# Patient Record
Sex: FEMALE | Race: OTHER | HISPANIC OR LATINO | Employment: FULL TIME | URBAN - METROPOLITAN AREA
[De-identification: names, ages, dates, MRNs, and addresses within clinical notes are randomized per-mention and may not be internally consistent; named-entity substitution may affect disease eponyms.]

---

## 2017-04-21 ENCOUNTER — HOSPITAL ENCOUNTER (OUTPATIENT)
Facility: MEDICAL CENTER | Age: 42
End: 2017-04-21
Attending: PHYSICIAN ASSISTANT
Payer: COMMERCIAL

## 2017-04-21 ENCOUNTER — OFFICE VISIT (OUTPATIENT)
Dept: URGENT CARE | Facility: CLINIC | Age: 42
End: 2017-04-21
Payer: COMMERCIAL

## 2017-04-21 VITALS
RESPIRATION RATE: 16 BRPM | HEIGHT: 63 IN | DIASTOLIC BLOOD PRESSURE: 76 MMHG | TEMPERATURE: 98.9 F | SYSTOLIC BLOOD PRESSURE: 132 MMHG | OXYGEN SATURATION: 100 % | BODY MASS INDEX: 29.06 KG/M2 | WEIGHT: 164 LBS | HEART RATE: 101 BPM

## 2017-04-21 DIAGNOSIS — A49.9 BACTERIAL UTI: Primary | ICD-10-CM

## 2017-04-21 DIAGNOSIS — N39.0 BACTERIAL UTI: Primary | ICD-10-CM

## 2017-04-21 DIAGNOSIS — A49.9 BACTERIAL UTI: ICD-10-CM

## 2017-04-21 DIAGNOSIS — N39.0 BACTERIAL UTI: ICD-10-CM

## 2017-04-21 DIAGNOSIS — R30.0 DYSURIA: ICD-10-CM

## 2017-04-21 LAB
APPEARANCE UR: NORMAL
BILIRUB UR STRIP-MCNC: NORMAL MG/DL
COLOR UR AUTO: YELLOW
GLUCOSE UR STRIP.AUTO-MCNC: NORMAL MG/DL
KETONES UR STRIP.AUTO-MCNC: 160 MG/DL
LEUKOCYTE ESTERASE UR QL STRIP.AUTO: NORMAL
NITRITE UR QL STRIP.AUTO: NORMAL
PH UR STRIP.AUTO: 5 [PH] (ref 5–8)
PROT UR QL STRIP: 30 MG/DL
RBC UR QL AUTO: NORMAL
SP GR UR STRIP.AUTO: 1.03
UROBILINOGEN UR STRIP-MCNC: NORMAL MG/DL

## 2017-04-21 PROCEDURE — 87086 URINE CULTURE/COLONY COUNT: CPT

## 2017-04-21 PROCEDURE — 99204 OFFICE O/P NEW MOD 45 MIN: CPT | Performed by: PHYSICIAN ASSISTANT

## 2017-04-21 PROCEDURE — 81002 URINALYSIS NONAUTO W/O SCOPE: CPT | Performed by: PHYSICIAN ASSISTANT

## 2017-04-21 PROCEDURE — 87077 CULTURE AEROBIC IDENTIFY: CPT

## 2017-04-21 PROCEDURE — 87186 SC STD MICRODIL/AGAR DIL: CPT

## 2017-04-21 RX ORDER — NITROFURANTOIN 25; 75 MG/1; MG/1
100 CAPSULE ORAL 2 TIMES DAILY
Qty: 14 CAP | Refills: 0 | Status: SHIPPED | OUTPATIENT
Start: 2017-04-21 | End: 2017-04-28

## 2017-04-21 RX ORDER — PHENAZOPYRIDINE HYDROCHLORIDE 200 MG/1
200 TABLET, FILM COATED ORAL 3 TIMES DAILY PRN
Qty: 6 TAB | Refills: 0 | Status: SHIPPED | OUTPATIENT
Start: 2017-04-21 | End: 2019-09-26

## 2017-04-21 NOTE — MR AVS SNAPSHOT
"        Lakesha Hayzco   2017 12:30 PM   Office Visit   MRN: [id number]    Department:  [facility]   Dept Phone:  [phone]    Description:  Female : 1975   Provider:  Yg Luciano PA-C           Reason for Visit     Dysuria x 3 days w/blood in urine      Allergies as of 2017     No Known Allergies      You were diagnosed with     Bacterial UTI   [590644]  -  Primary     Dysuria   [788.1.ICD-9-CM]         Vital Signs     Blood Pressure Pulse Temperature Respirations Height Weight    132/76 mmHg 101 37.2 °C (98.9 °F) 16 1.6 m (5' 2.99\") 74.39 kg (164 lb)    Body Mass Index Oxygen Saturation Breastfeeding? Smoking Status          29.06 kg/m2 100% No Never Smoker         Basic Information     Date Of Birth Sex Race Ethnicity Preferred Language    1975 Female  or   Origin (Bahamian,Bangladeshi,Panamanian,Les, etc) English      Problem List              ICD-10-CM Priority Class Noted - Resolved    Congenital spondylolisthesis Q76.2   3/13/2013 - Present      Health Maintenance     Patient has no pending health maintenance at this time      Results     POCT Urinalysis      Component Value Standard Range & Units    POC Color yellow Negative    POC Appearance cloudy Negative    POC Leukocyte Esterase mod Negative    POC Nitrites n Negative    POC Urobiligen n Negative (0.2) mg/dL    POC Protein 30 Negative mg/dL    POC Urine PH 5.0 5.0 - 8.0    POC Blood large Negative    POC Specific Gravity 1.030 <1.005 - >1.030    POC Ketones 160 Negative mg/dL    POC Biliruben n Negative mg/dL    POC Glucose n Negative mg/dL                        Current Immunizations     No immunizations on file.      Below and/or attached are the medications your provider expects you to take. Review all of your home medications and newly ordered medications with your provider and/or pharmacist. Follow medication instructions as directed by your provider and/or pharmacist. Please " keep your medication list with you and share with your provider. Update the information when medications are discontinued, doses are changed, or new medications (including over-the-counter products) are added; and carry medication information at all times in the event of emergency situations     Allergies:  No Known Allergies          Medications  Valid as of: April 21, 2017 -  1:08 PM    Generic Name Brand Name Tablet Size Instructions for use    Hydrocodone-Acetaminophen (Tab) NORCO 5-325 MG Take 1-2 Tabs by mouth every four hours as needed.        Morphine Sulfate (TABLET SR 12 HR) MS CONTIN 60 MG Take 60 mg by mouth 3 times a day. Indications: Moderate to Severe Chronic Pain        Nitrofurantoin Monohyd Macro (Cap) MACROBID 100 MG Take 1 Cap by mouth 2 times a day for 7 days.        Phenazopyridine HCl (Tab) PYRIDIUM 200 MG Take 1 Tab by mouth 3 times a day as needed.        .                 Medicines prescribed today were sent to:     [id number] - [place], [place] - [address]    [address] [place] [address]    Phone: [phone] Fax: [phone]    Open 24 Hours?: No      Medication refill instructions:       If your prescription bottle indicates you have medication refills left, it is not necessary to call your provider’s office. Please contact your pharmacy and they will refill your medication.    If your prescription bottle indicates you do not have any refills left, you may request refills at any time through one of the following ways: The online [employer] system (except Urgent Care), by calling your provider’s office, or by asking your pharmacy to contact your provider’s office with a refill request. Medication refills are processed only during regular business hours and may not be available until the next business day. Your provider may request additional information or to have a follow-up visit with you prior to refilling your medication.   *Please Note: Medication refills are assigned  a new Rx number when refilled electronically. Your pharmacy may indicate that no refills were authorized even though a new prescription for the same medication is available at the pharmacy. Please request the medicine by name with the pharmacy before contacting your provider for a refill.        Your To Do List     Future Labs/Procedures Complete By Expires    URINE CULTURE(NEW)  As directed 4/21/2018      Instructions    Urinary Tract Infection  Urinary tract infections (UTIs) can develop anywhere along your urinary tract. Your urinary tract is your body's drainage system for removing wastes and extra water. Your urinary tract includes two kidneys, two ureters, a bladder, and a urethra. Your kidneys are a pair of bean-shaped organs. Each kidney is about the size of your fist. They are located below your ribs, one on each side of your spine.  CAUSES  Infections are caused by microbes, which are microscopic organisms, including fungi, viruses, and bacteria. These organisms are so small that they can only be seen through a microscope. Bacteria are the microbes that most commonly cause UTIs.  SYMPTOMS   Symptoms of UTIs may vary by age and gender of the patient and by the location of the infection. Symptoms in young women typically include a frequent and intense urge to urinate and a painful, burning feeling in the bladder or urethra during urination. Older women and men are more likely to be tired, shaky, and weak and have muscle aches and abdominal pain. A fever may mean the infection is in your kidneys. Other symptoms of a kidney infection include pain in your back or sides below the ribs, nausea, and vomiting.  DIAGNOSIS  To diagnose a UTI, your caregiver will ask you about your symptoms. Your caregiver also will ask to provide a urine sample. The urine sample will be tested for bacteria and white blood cells. White blood cells are made by your body to help fight infection.  TREATMENT   Typically, UTIs can be  treated with medication. Because most UTIs are caused by a bacterial infection, they usually can be treated with the use of antibiotics. The choice of antibiotic and length of treatment depend on your symptoms and the type of bacteria causing your infection.  HOME CARE INSTRUCTIONS  · If you were prescribed antibiotics, take them exactly as your caregiver instructs you. Finish the medication even if you feel better after you have only taken some of the medication.  · Drink enough water and fluids to keep your urine clear or pale yellow.  · Avoid caffeine, tea, and carbonated beverages. They tend to irritate your bladder.  · Empty your bladder often. Avoid holding urine for long periods of time.  · Empty your bladder before and after sexual intercourse.  · After a bowel movement, women should cleanse from front to back. Use each tissue only once.  SEEK MEDICAL CARE IF:   · You have back pain.  · You develop a fever.  · Your symptoms do not begin to resolve within 3 days.  SEEK IMMEDIATE MEDICAL CARE IF:   · You have severe back pain or lower abdominal pain.  · You develop chills.  · You have nausea or vomiting.  · You have continued burning or discomfort with urination.  MAKE SURE YOU:   · Understand these instructions.  · Will watch your condition.  · Will get help right away if you are not doing well or get worse.     This information is not intended to replace advice given to you by your health care provider. Make sure you discuss any questions you have with your health care provider.     Document Released: 09/27/2006 Document Revised: 01/08/2016 Document Reviewed: 01/25/2013  [employer] Interactive Patient Education ©2016 [employer]            MyChart Status: Patient Declined

## 2017-04-21 NOTE — PATIENT INSTRUCTIONS
Urinary Tract Infection  Urinary tract infections (UTIs) can develop anywhere along your urinary tract. Your urinary tract is your body's drainage system for removing wastes and extra water. Your urinary tract includes two kidneys, two ureters, a bladder, and a urethra. Your kidneys are a pair of bean-shaped organs. Each kidney is about the size of your fist. They are located below your ribs, one on each side of your spine.  CAUSES  Infections are caused by microbes, which are microscopic organisms, including fungi, viruses, and bacteria. These organisms are so small that they can only be seen through a microscope. Bacteria are the microbes that most commonly cause UTIs.  SYMPTOMS   Symptoms of UTIs may vary by age and gender of the patient and by the location of the infection. Symptoms in young women typically include a frequent and intense urge to urinate and a painful, burning feeling in the bladder or urethra during urination. Older women and men are more likely to be tired, shaky, and weak and have muscle aches and abdominal pain. A fever may mean the infection is in your kidneys. Other symptoms of a kidney infection include pain in your back or sides below the ribs, nausea, and vomiting.  DIAGNOSIS  To diagnose a UTI, your caregiver will ask you about your symptoms. Your caregiver also will ask to provide a urine sample. The urine sample will be tested for bacteria and white blood cells. White blood cells are made by your body to help fight infection.  TREATMENT   Typically, UTIs can be treated with medication. Because most UTIs are caused by a bacterial infection, they usually can be treated with the use of antibiotics. The choice of antibiotic and length of treatment depend on your symptoms and the type of bacteria causing your infection.  HOME CARE INSTRUCTIONS  · If you were prescribed antibiotics, take them exactly as your caregiver instructs you. Finish the medication even if you feel better after you  have only taken some of the medication.  · Drink enough water and fluids to keep your urine clear or pale yellow.  · Avoid caffeine, tea, and carbonated beverages. They tend to irritate your bladder.  · Empty your bladder often. Avoid holding urine for long periods of time.  · Empty your bladder before and after sexual intercourse.  · After a bowel movement, women should cleanse from front to back. Use each tissue only once.  SEEK MEDICAL CARE IF:   · You have back pain.  · You develop a fever.  · Your symptoms do not begin to resolve within 3 days.  SEEK IMMEDIATE MEDICAL CARE IF:   · You have severe back pain or lower abdominal pain.  · You develop chills.  · You have nausea or vomiting.  · You have continued burning or discomfort with urination.  MAKE SURE YOU:   · Understand these instructions.  · Will watch your condition.  · Will get help right away if you are not doing well or get worse.     This information is not intended to replace advice given to you by your health care provider. Make sure you discuss any questions you have with your health care provider.     Document Released: 09/27/2006 Document Revised: 01/08/2016 Document Reviewed: 01/25/2013  [employer] Interactive Patient Education ©2016 [employer]

## 2017-04-21 NOTE — PROGRESS NOTES
Subjective:      Pt is a 41 y.o. female who presents with Dysuria            Dysuria   This is a new problem. The current episode started in the past 7 days. The problem occurs every urination. The problem has been gradually worsening. The quality of the pain is described as burning and aching. The pain is at a severity of 7/10. The pain is moderate. There has been no fever. She is sexually active. There is no history of pyelonephritis. She has tried increased fluids for the symptoms. There is no history of catheterization, kidney stones, recurrent UTIs, a single kidney, urinary stasis or a urological procedure.   PT comes into the  with a chief complaint of dysuria, burning on urination, urgency, frequency, and bladder pressure and has noticed blood in her urine x 3 days. PT denies fevers or chills, CP, SOB, NVD, paresthesias, headaches, dizziness, change in vision, hives, or joint pain. PT states the pain is a 7/10, aching in nature and worse at night. She states she has not taken any meds for this issue. She denies flank or back pain as well. The pt's medication list, problem list, and allergies have been evaluated and reviewed during today's visit.        PMH:  Past Medical History   Diagnosis Date   • Other specified disorder of intestines    • Pain        PSH:  Past Surgical History   Procedure Laterality Date   • Cholecystectomy     • Lumbar fusion posterior  3/13/2013     Performed by Arleth Garcia M.D. at SURGERY [facility]   • Lumbar laminectomy diskectomy  3/13/2013     Performed by Arleth Garcia M.D. at SURGERY [facility]   • Lumbar decompression  3/13/2013     Performed by Arleth Garcia M.D. at SURGERY [facility]       Fam Hx:  Father with hx of HTN      Soc HX:  Social History     Social History   • Marital Status:      Spouse Name: N/A   • Number of Children: N/A   • Years of Education: N/A     Occupational History   • Not on file.     Social History Main Topics   •  "Smoking status: Never Smoker    • Smokeless tobacco: Never Used   • Alcohol Use: No   • Drug Use: No   • Sexual Activity: Not on file     Other Topics Concern   • Not on file     Social History Narrative         Medications:    Current outpatient prescriptions:   •  nitrofurantoin monohydr macro (MACROBID) 100 MG Cap, Take 1 Cap by mouth 2 times a day for 7 days., Disp: 14 Cap, Rfl: 0  •  phenazopyridine (PYRIDIUM) 200 MG Tab, Take 1 Tab by mouth 3 times a day as needed., Disp: 6 Tab, Rfl: 0  •  hydrocodone-acetaminophen (NORCO) 5-325 MG Tab per tablet, Take 1-2 Tabs by mouth every four hours as needed., Disp: , Rfl:   •  morphine SR (MS CONTIN) 60 MG TB12, Take 60 mg by mouth 3 times a day. Indications: Moderate to Severe Chronic Pain, Disp: , Rfl:       Allergies:  Review of patient's allergies indicates no known allergies.        Review of Systems   Genitourinary: Positive for dysuria.   Review of Systems   Constitutional: Negative for fever, chills and malaise/fatigue.   HENT: Negative for congestion and sore throat.    Eyes: Negative for blurred vision, double vision and photophobia.   Respiratory: Negative for cough and shortness of breath.    Cardiovascular: Negative for chest pain and palpitations.   Gastrointestinal: Negative for nausea, vomiting, abdominal pain, diarrhea and constipation.   Genitourinary: Positive for dysuria, urgency and frequency. Negative for hematuria and flank pain.   Musculoskeletal: Negative for joint pain and myalgias.   Skin: Negative for rash.   Neurological: Negative for dizziness, tingling and headaches.   Endo/Heme/Allergies: Does not bruise/bleed easily.   Psychiatric/Behavioral: Negative for depression. The patient is not nervous/anxious.             Objective:     /76 mmHg  Pulse 101  Temp(Src) 37.2 °C (98.9 °F)  Resp 16  Ht 1.6 m (5' 2.99\")  Wt 74.39 kg (164 lb)  BMI 29.06 kg/m2  SpO2 100%  Breastfeeding? No     Physical Exam       Physical Exam "   Constitutional: She is oriented to person, place, and time. She appears well-developed and well-nourished. No distress.   HENT:   Head: Normocephalic and atraumatic.   Right Ear: External ear normal.   Left Ear: External ear normal.   Nose: Nose normal.   Mouth/Throat: Oropharynx is clear and moist. No oropharyngeal exudate.   Eyes: Conjunctivae normal and EOM are normal. Pupils are equal, round, and reactive to light.   Neck: Normal range of motion. Neck supple. No thyromegaly present.   Cardiovascular: Normal rate, regular rhythm, normal heart sounds and intact distal pulses.  Exam reveals no gallop and no friction rub.    No murmur heard.  Pulmonary/Chest: Effort normal and breath sounds normal. No respiratory distress. She has no wheezes. She has no rales. She exhibits no tenderness.   Abdominal: Soft. Bowel sounds are normal. She exhibits no distension and no mass. There is no tenderness. There is no rebound and no guarding.   Genitourinary:        Pt deferred   Musculoskeletal: Normal range of motion. She exhibits no edema and no tenderness.   Lymphadenopathy:     She has no cervical adenopathy.   Neurological: She is alert and oriented to person, place, and time. She has normal reflexes. No cranial nerve deficit.   Skin: Skin is warm and dry. No rash noted. No erythema.   Psychiatric: She has a normal mood and affect. Her behavior is normal. Judgment and thought content normal.        Assessment/Plan:     1. Bacterial UTI    - URINE CULTURE(NEW); Future  - nitrofurantoin monohydr macro (MACROBID) 100 MG Cap; Take 1 Cap by mouth 2 times a day for 7 days.  Dispense: 14 Cap; Refill: 0  - phenazopyridine (PYRIDIUM) 200 MG Tab; Take 1 Tab by mouth 3 times a day as needed.  Dispense: 6 Tab; Refill: 0    2. Dysuria    - POCT Urinalysis->LEUKS AND BLOOD    Rest, fluids encouraged.  AVS with medical info given.  Pt was in full understanding and agreement with the plan.  Follow-up as needed if symptoms worsen or fail  to improve.

## 2017-04-23 LAB
BACTERIA UR CULT: ABNORMAL
SIGNIFICANT IND 70042: ABNORMAL
SOURCE SOURCE: ABNORMAL

## 2017-04-25 ENCOUNTER — TELEPHONE (OUTPATIENT)
Dept: URGENT CARE | Facility: PHYSICIAN GROUP | Age: 42
End: 2017-04-25

## 2017-04-25 DIAGNOSIS — A49.9 BACTERIAL UTI: Primary | ICD-10-CM

## 2017-04-25 DIAGNOSIS — N39.0 BACTERIAL UTI: Primary | ICD-10-CM

## 2017-04-25 RX ORDER — CIPROFLOXACIN 500 MG/1
500 TABLET, FILM COATED ORAL 2 TIMES DAILY
Qty: 10 TAB | Refills: 0 | Status: SHIPPED | OUTPATIENT
Start: 2017-04-25 | End: 2017-04-30

## 2017-04-25 NOTE — TELEPHONE ENCOUNTER
Called and spoke with pt about urine culture results which came back positive for proteus.   I switched abx therapies to one that would work, Cipro.  Pt was thankful for the phone call.  Yg Luciano PA-C

## 2018-06-14 ENCOUNTER — HOSPITAL ENCOUNTER (OUTPATIENT)
Facility: MEDICAL CENTER | Age: 43
End: 2018-06-14
Attending: NURSE PRACTITIONER
Payer: COMMERCIAL

## 2018-06-14 PROCEDURE — 88175 CYTOPATH C/V AUTO FLUID REDO: CPT

## 2018-06-14 PROCEDURE — 87624 HPV HI-RISK TYP POOLED RSLT: CPT

## 2018-06-18 LAB
CYTOLOGY REG CYTOL: NORMAL
HPV HR 12 DNA CVX QL NAA+PROBE: NEGATIVE
HPV16 DNA SPEC QL NAA+PROBE: NEGATIVE
HPV18 DNA SPEC QL NAA+PROBE: NEGATIVE
SPECIMEN SOURCE: NORMAL

## 2018-06-29 ENCOUNTER — HOSPITAL ENCOUNTER (OUTPATIENT)
Dept: LAB | Facility: MEDICAL CENTER | Age: 43
End: 2018-06-29
Attending: NURSE PRACTITIONER
Payer: COMMERCIAL

## 2018-06-29 LAB
ALBUMIN SERPL BCP-MCNC: 3.9 G/DL (ref 3.2–4.9)
ALBUMIN/GLOB SERPL: 1.1 G/DL
ALP SERPL-CCNC: 51 U/L (ref 30–99)
ALT SERPL-CCNC: 11 U/L (ref 2–50)
ANION GAP SERPL CALC-SCNC: 6 MMOL/L (ref 0–11.9)
AST SERPL-CCNC: 17 U/L (ref 12–45)
BILIRUB SERPL-MCNC: 0.6 MG/DL (ref 0.1–1.5)
BUN SERPL-MCNC: 15 MG/DL (ref 8–22)
CALCIUM SERPL-MCNC: 8.7 MG/DL (ref 8.5–10.5)
CHLORIDE SERPL-SCNC: 103 MMOL/L (ref 96–112)
CHOLEST SERPL-MCNC: 169 MG/DL (ref 100–199)
CO2 SERPL-SCNC: 28 MMOL/L (ref 20–33)
CREAT SERPL-MCNC: 0.74 MG/DL (ref 0.5–1.4)
GLOBULIN SER CALC-MCNC: 3.4 G/DL (ref 1.9–3.5)
GLUCOSE SERPL-MCNC: 83 MG/DL (ref 65–99)
HDLC SERPL-MCNC: 41 MG/DL
LDLC SERPL CALC-MCNC: 92 MG/DL
POTASSIUM SERPL-SCNC: 3.7 MMOL/L (ref 3.6–5.5)
PROT SERPL-MCNC: 7.3 G/DL (ref 6–8.2)
SODIUM SERPL-SCNC: 137 MMOL/L (ref 135–145)
TRIGL SERPL-MCNC: 182 MG/DL (ref 0–149)
TSH SERPL DL<=0.005 MIU/L-ACNC: 3.64 UIU/ML (ref 0.38–5.33)

## 2018-06-29 PROCEDURE — 80053 COMPREHEN METABOLIC PANEL: CPT

## 2018-06-29 PROCEDURE — 80061 LIPID PANEL: CPT

## 2018-06-29 PROCEDURE — 36415 COLL VENOUS BLD VENIPUNCTURE: CPT

## 2018-06-29 PROCEDURE — 84443 ASSAY THYROID STIM HORMONE: CPT

## 2018-09-08 ENCOUNTER — HOSPITAL ENCOUNTER (OUTPATIENT)
Facility: MEDICAL CENTER | Age: 43
End: 2018-09-08
Payer: COMMERCIAL

## 2018-09-08 LAB
BDY FAT % MEASURED: 39.5 %
BP DIAS: 72 MMHG
BP SYS: 114 MMHG
DIABETES HTDIA: NO
EVENT NAME HTEVT: NORMAL
HYPERTENSION HTHYP: NO
SCREENING LOC CITY HTCIT: NORMAL
SCREENING LOC STATE HTSTA: NORMAL
SCREENING LOCATION HTLOC: NORMAL
SUBSCRIBER ID HTSID: NORMAL

## 2018-09-09 LAB
CHOLEST SERPL-MCNC: 188 MG/DL (ref 100–199)
FASTING STATUS PATIENT QL REPORTED: NORMAL
GLUCOSE SERPL-MCNC: 91 MG/DL (ref 65–99)
HDLC SERPL-MCNC: 41 MG/DL
LDLC SERPL CALC-MCNC: 114 MG/DL
TRIGL SERPL-MCNC: 165 MG/DL (ref 0–149)

## 2018-11-16 ENCOUNTER — HOSPITAL ENCOUNTER (EMERGENCY)
Facility: MEDICAL CENTER | Age: 43
End: 2018-11-16
Attending: EMERGENCY MEDICINE
Payer: COMMERCIAL

## 2018-11-16 ENCOUNTER — APPOINTMENT (OUTPATIENT)
Dept: RADIOLOGY | Facility: MEDICAL CENTER | Age: 43
End: 2018-11-16
Attending: EMERGENCY MEDICINE
Payer: COMMERCIAL

## 2018-11-16 VITALS
SYSTOLIC BLOOD PRESSURE: 135 MMHG | WEIGHT: 183.2 LBS | HEART RATE: 101 BPM | BODY MASS INDEX: 32.46 KG/M2 | TEMPERATURE: 97.9 F | DIASTOLIC BLOOD PRESSURE: 82 MMHG | HEIGHT: 63 IN | OXYGEN SATURATION: 94 % | RESPIRATION RATE: 16 BRPM

## 2018-11-16 DIAGNOSIS — I47.10 PAROXYSMAL SVT (SUPRAVENTRICULAR TACHYCARDIA): ICD-10-CM

## 2018-11-16 LAB
ALBUMIN SERPL BCP-MCNC: 3.9 G/DL (ref 3.2–4.9)
ALBUMIN/GLOB SERPL: 1.2 G/DL
ALP SERPL-CCNC: 79 U/L (ref 30–99)
ALT SERPL-CCNC: 15 U/L (ref 2–50)
ANION GAP SERPL CALC-SCNC: 9 MMOL/L (ref 0–11.9)
APTT PPP: 26.9 SEC (ref 24.7–36)
AST SERPL-CCNC: 14 U/L (ref 12–45)
BASOPHILS # BLD AUTO: 0.4 % (ref 0–1.8)
BASOPHILS # BLD: 0.04 K/UL (ref 0–0.12)
BILIRUB SERPL-MCNC: 0.4 MG/DL (ref 0.1–1.5)
BNP SERPL-MCNC: 23 PG/ML (ref 0–100)
BUN SERPL-MCNC: 9 MG/DL (ref 8–22)
CALCIUM SERPL-MCNC: 9.1 MG/DL (ref 8.5–10.5)
CHLORIDE SERPL-SCNC: 102 MMOL/L (ref 96–112)
CO2 SERPL-SCNC: 26 MMOL/L (ref 20–33)
CREAT SERPL-MCNC: 0.75 MG/DL (ref 0.5–1.4)
EKG IMPRESSION: NORMAL
EKG IMPRESSION: NORMAL
EOSINOPHIL # BLD AUTO: 0.13 K/UL (ref 0–0.51)
EOSINOPHIL NFR BLD: 1.4 % (ref 0–6.9)
ERYTHROCYTE [DISTWIDTH] IN BLOOD BY AUTOMATED COUNT: 43 FL (ref 35.9–50)
GLOBULIN SER CALC-MCNC: 3.2 G/DL (ref 1.9–3.5)
GLUCOSE SERPL-MCNC: 125 MG/DL (ref 65–99)
HCG SERPL QL: NEGATIVE
HCT VFR BLD AUTO: 39.8 % (ref 37–47)
HGB BLD-MCNC: 13.2 G/DL (ref 12–16)
IMM GRANULOCYTES # BLD AUTO: 0.05 K/UL (ref 0–0.11)
IMM GRANULOCYTES NFR BLD AUTO: 0.5 % (ref 0–0.9)
INR PPP: 0.99 (ref 0.87–1.13)
LIPASE SERPL-CCNC: 26 U/L (ref 11–82)
LYMPHOCYTES # BLD AUTO: 2.34 K/UL (ref 1–4.8)
LYMPHOCYTES NFR BLD: 25.1 % (ref 22–41)
MAGNESIUM SERPL-MCNC: 1.7 MG/DL (ref 1.5–2.5)
MCH RBC QN AUTO: 30.8 PG (ref 27–33)
MCHC RBC AUTO-ENTMCNC: 33.2 G/DL (ref 33.6–35)
MCV RBC AUTO: 92.8 FL (ref 81.4–97.8)
MONOCYTES # BLD AUTO: 0.46 K/UL (ref 0–0.85)
MONOCYTES NFR BLD AUTO: 4.9 % (ref 0–13.4)
NEUTROPHILS # BLD AUTO: 6.3 K/UL (ref 2–7.15)
NEUTROPHILS NFR BLD: 67.7 % (ref 44–72)
NRBC # BLD AUTO: 0 K/UL
NRBC BLD-RTO: 0 /100 WBC
PHOSPHATE SERPL-MCNC: 3.2 MG/DL (ref 2.5–4.5)
PLATELET # BLD AUTO: 233 K/UL (ref 164–446)
PMV BLD AUTO: 9.1 FL (ref 9–12.9)
POTASSIUM SERPL-SCNC: 3.3 MMOL/L (ref 3.6–5.5)
PROT SERPL-MCNC: 7.1 G/DL (ref 6–8.2)
PROTHROMBIN TIME: 13.2 SEC (ref 12–14.6)
RBC # BLD AUTO: 4.29 M/UL (ref 4.2–5.4)
SODIUM SERPL-SCNC: 137 MMOL/L (ref 135–145)
TROPONIN I SERPL-MCNC: <0.01 NG/ML (ref 0–0.04)
WBC # BLD AUTO: 9.3 K/UL (ref 4.8–10.8)

## 2018-11-16 PROCEDURE — 83880 ASSAY OF NATRIURETIC PEPTIDE: CPT

## 2018-11-16 PROCEDURE — 84100 ASSAY OF PHOSPHORUS: CPT

## 2018-11-16 PROCEDURE — 85025 COMPLETE CBC W/AUTO DIFF WBC: CPT

## 2018-11-16 PROCEDURE — 83735 ASSAY OF MAGNESIUM: CPT

## 2018-11-16 PROCEDURE — 71045 X-RAY EXAM CHEST 1 VIEW: CPT

## 2018-11-16 PROCEDURE — 84484 ASSAY OF TROPONIN QUANT: CPT

## 2018-11-16 PROCEDURE — 84703 CHORIONIC GONADOTROPIN ASSAY: CPT

## 2018-11-16 PROCEDURE — 85610 PROTHROMBIN TIME: CPT

## 2018-11-16 PROCEDURE — 96374 THER/PROPH/DIAG INJ IV PUSH: CPT

## 2018-11-16 PROCEDURE — 700102 HCHG RX REV CODE 250 W/ 637 OVERRIDE(OP): Performed by: EMERGENCY MEDICINE

## 2018-11-16 PROCEDURE — A9270 NON-COVERED ITEM OR SERVICE: HCPCS | Performed by: EMERGENCY MEDICINE

## 2018-11-16 PROCEDURE — 83690 ASSAY OF LIPASE: CPT

## 2018-11-16 PROCEDURE — 700101 HCHG RX REV CODE 250: Performed by: EMERGENCY MEDICINE

## 2018-11-16 PROCEDURE — 93005 ELECTROCARDIOGRAM TRACING: CPT

## 2018-11-16 PROCEDURE — 99285 EMERGENCY DEPT VISIT HI MDM: CPT

## 2018-11-16 PROCEDURE — 85730 THROMBOPLASTIN TIME PARTIAL: CPT

## 2018-11-16 PROCEDURE — 93005 ELECTROCARDIOGRAM TRACING: CPT | Performed by: EMERGENCY MEDICINE

## 2018-11-16 PROCEDURE — 80053 COMPREHEN METABOLIC PANEL: CPT

## 2018-11-16 RX ORDER — ASPIRIN 81 MG/1
324 TABLET, CHEWABLE ORAL ONCE
Status: COMPLETED | OUTPATIENT
Start: 2018-11-16 | End: 2018-11-16

## 2018-11-16 RX ORDER — METOPROLOL TARTRATE 1 MG/ML
5 INJECTION, SOLUTION INTRAVENOUS ONCE
Status: COMPLETED | OUTPATIENT
Start: 2018-11-16 | End: 2018-11-16

## 2018-11-16 RX ORDER — ASPIRIN 300 MG/1
300 SUPPOSITORY RECTAL ONCE
Status: COMPLETED | OUTPATIENT
Start: 2018-11-16 | End: 2018-11-16

## 2018-11-16 RX ORDER — METOPROLOL SUCCINATE 25 MG/1
25 TABLET, EXTENDED RELEASE ORAL DAILY
Qty: 30 TAB | Refills: 0 | Status: SHIPPED | OUTPATIENT
Start: 2018-11-16 | End: 2018-11-16

## 2018-11-16 RX ORDER — METOPROLOL SUCCINATE 25 MG/1
25 TABLET, EXTENDED RELEASE ORAL DAILY
Qty: 30 TAB | Refills: 0 | Status: SHIPPED | OUTPATIENT
Start: 2018-11-16 | End: 2019-09-26

## 2018-11-16 RX ADMIN — ASPIRIN 324 MG: 81 TABLET, CHEWABLE ORAL at 13:35

## 2018-11-16 RX ADMIN — METOPROLOL TARTRATE 5 MG: 1 INJECTION, SOLUTION INTRAVENOUS at 13:27

## 2018-11-16 ASSESSMENT — PAIN DESCRIPTION - DESCRIPTORS: DESCRIPTORS: PRESSURE

## 2018-11-16 NOTE — ED PROVIDER NOTES
ED Provider Note    Scribed for Khadijah Acuña M.D. by Mathew Harp. 11/16/2018  1:11 PM    Primary care provider: KENISHA Hackett  Means of arrival: Walk in  History obtained from: Patient  History limited by: None    CHIEF COMPLAINT  Chief Complaint   Patient presents with   • Shortness of Breath   • Chest Pressure       HPI  Lakesha Fagan is a 43 y.o. female who presents to the Emergency Department complaining of shortness of breath and pressure chest pain onset two hours prior to exam. Patient states her symptoms began with shortness of breath, left arm pain soreness, an dry mouth. She then developed abdominal pain and the chest pain. Patient states she was feeling anxious during this episode. She also notes having what felt like an anxiety attack a couple of days ago which was resolved for the rest of the day after she did some walking.  Patient denies any leg swelling. No complaints of fevers or chills. Patient denies history of diabetes, hypertension, or cardiac problems. She reports her mother has a pacemaker. She denies any recent surgery. No recent long travel. Patient is a non-smoker. She denies any alcohol or drug use. She denies any recent or regular caffeine intake.      REVIEW OF SYSTEMS  HEENT:  Positive dry mouth  CARDIAC: Positive chest pain  PULMONARY: Positive shortness of breath  GI: Positive abdominal pain   Musculoskeletal: Positive left arm soreness. Negative leg swelling  Endocrine: Negative fevers or chills  Psychiatric: Positive anxiety  See history of present illness. All other systems are negative.      PAST MEDICAL HISTORY   has a past medical history of Other specified disorder of intestines and Pain.    SURGICAL HISTORY   has a past surgical history that includes cholecystectomy; lumbar fusion posterior (3/13/2013); lumbar laminectomy diskectomy (3/13/2013); and lumbar decompression (3/13/2013).    SOCIAL HISTORY  Social History   Substance Use Topics  "  • Smoking status: Never Smoker   • Smokeless tobacco: Never Used   • Alcohol use No      History   Drug Use No       FAMILY HISTORY  History reviewed. No pertinent family history.    CURRENT MEDICATIONS  Home Medications     Reviewed by Anup Colorado R.N. (Registered Nurse) on 11/16/18 at 1304  Med List Status: Not Addressed   Medication Last Dose Status   hydrocodone-acetaminophen (NORCO) 5-325 MG Tab per tablet 4/21/2017 Active   morphine SR (MS CONTIN) 60 MG TB12 4/21/2017 Active   phenazopyridine (PYRIDIUM) 200 MG Tab  Active                ALLERGIES  No Known Allergies    PHYSICAL EXAM  VITAL SIGNS: BP (!) 169/94   Pulse (!) 170 Comment: verified  Temp 36.6 °C (97.9 °F) (Temporal)   Resp 20   Ht 1.6 m (5' 3\")   Wt 83.1 kg (183 lb 3.2 oz)   SpO2 96%   BMI 32.45 kg/m²   Constitutional: Well developed, Well nourished, No acute distress, Non-toxic appearance.   HEENT: Normocephalic, Atraumatic,  external ears normal, pharynx pink,  Mucous  Membranes moist, No rhinorrhea or mucosal edema  Eyes: PERRL, EOMI, Conjunctiva normal, No discharge.   Neck: Normal range of motion, No tenderness, Supple, No stridor.   Lymphatic: No lymphadenopathy    Cardiovascular: Tachycardic. Regular Rhythm, No murmurs,  rubs, or gallops.   Thorax & Lungs: Lungs clear to auscultation bilaterally, No respiratory distress, No wheezes, rhales or rhonchi, No chest wall tenderness. Speaking full sentences.  Abdomen: Bowel sounds normal, Soft, non tender, non distended,  No pulsatile masses., no rebound guarding or peritoneal signs.   Skin: Warm, Dry, No erythema, No rash,   Back:  No CVA tenderness,  No spinal tenderness, bony crepitance, step offs, or instability.   Neurologic: Alert & oriented x 3, Normal motor function, Normal sensory function, No focal deficits noted. Normal reflexes. Normal Cranial Nerves.  Extremities: Equal, intact distal pulses, No cyanosis, clubbing or edema,  No tenderness.   Musculoskeletal: Good " range of motion in all major joints. No tenderness to palpation or major deformities noted.       DIAGNOSTIC STUDIES / PROCEDURES    LABS  Results for orders placed or performed during the hospital encounter of 11/16/18   CBC with Differential   Result Value Ref Range    WBC 9.3 4.8 - 10.8 K/uL    RBC 4.29 4.20 - 5.40 M/uL    Hemoglobin 13.2 12.0 - 16.0 g/dL    Hematocrit 39.8 37.0 - 47.0 %    MCV 92.8 81.4 - 97.8 fL    MCH 30.8 27.0 - 33.0 pg    MCHC 33.2 (L) 33.6 - 35.0 g/dL    RDW 43.0 35.9 - 50.0 fL    Platelet Count 233 164 - 446 K/uL    MPV 9.1 9.0 - 12.9 fL    Neutrophils-Polys 67.70 44.00 - 72.00 %    Lymphocytes 25.10 22.00 - 41.00 %    Monocytes 4.90 0.00 - 13.40 %    Eosinophils 1.40 0.00 - 6.90 %    Basophils 0.40 0.00 - 1.80 %    Immature Granulocytes 0.50 0.00 - 0.90 %    Nucleated RBC 0.00 /100 WBC    Neutrophils (Absolute) 6.30 2.00 - 7.15 K/uL    Lymphs (Absolute) 2.34 1.00 - 4.80 K/uL    Monos (Absolute) 0.46 0.00 - 0.85 K/uL    Eos (Absolute) 0.13 0.00 - 0.51 K/uL    Baso (Absolute) 0.04 0.00 - 0.12 K/uL    Immature Granulocytes (abs) 0.05 0.00 - 0.11 K/uL    NRBC (Absolute) 0.00 K/uL   Complete Metabolic Panel (CMP)   Result Value Ref Range    Sodium 137 135 - 145 mmol/L    Potassium 3.3 (L) 3.6 - 5.5 mmol/L    Chloride 102 96 - 112 mmol/L    Co2 26 20 - 33 mmol/L    Anion Gap 9.0 0.0 - 11.9    Glucose 125 (H) 65 - 99 mg/dL    Bun 9 8 - 22 mg/dL    Creatinine 0.75 0.50 - 1.40 mg/dL    Calcium 9.1 8.5 - 10.5 mg/dL    AST(SGOT) 14 12 - 45 U/L    ALT(SGPT) 15 2 - 50 U/L    Alkaline Phosphatase 79 30 - 99 U/L    Total Bilirubin 0.4 0.1 - 1.5 mg/dL    Albumin 3.9 3.2 - 4.9 g/dL    Total Protein 7.1 6.0 - 8.2 g/dL    Globulin 3.2 1.9 - 3.5 g/dL    A-G Ratio 1.2 g/dL   Btype Natriuretic Peptide (BNP)   Result Value Ref Range    B Natriuretic Peptide 23 0 - 100 pg/mL   Prothrombin Time (PT/INR)   Result Value Ref Range    PT 13.2 12.0 - 14.6 sec    INR 0.99 0.87 - 1.13   APTT   Result Value Ref Range     APTT 26.9 24.7 - 36.0 sec   Lipase   Result Value Ref Range    Lipase 26 11 - 82 U/L   Troponin STAT   Result Value Ref Range    Troponin I <0.01 0.00 - 0.04 ng/mL   HCG Qual Serum   Result Value Ref Range    Beta-Hcg Qualitative Serum Negative Negative   Magnesium   Result Value Ref Range    Magnesium 1.7 1.5 - 2.5 mg/dL   Phosphorus   Result Value Ref Range    Phosphorus 3.2 2.5 - 4.5 mg/dL   ESTIMATED GFR   Result Value Ref Range    GFR If African American >60 >60 mL/min/1.73 m 2    GFR If Non African American >60 >60 mL/min/1.73 m 2   EKG (NOW)   Result Value Ref Range    Report       [facility] Emergency Dept.    Test Date:  2018  Pt Name:    RIKA HANEY        Department: ER  MRN:        [id number]                      Room:  Gender:     Female                       Technician: [id number]  :        1975                   Requested By:ER TRIAGE PROTOCOL  Order #:    [id number]                    Reading MD: CHAUCNEY TURCIOS MD    Measurements  Intervals                                Axis  Rate:       150                          P:  LA:                                      QRS:        40  QRSD:       92                           T:          49  QT:         304  QTc:        481    Interpretive Statements  JUNCTIONAL TACHYCARDIA  Compared to ECG 2013 09:57:49  Junctional tachycardia now present  Sinus rhythm no longer present    Electronically Signed On 2018 13:23:32 PST by CHAUNCEY TURCIOS MD     EKG   Result Value Ref Range    Report       [facility] Emergency Dept.    Test Date:  2018  Pt Name:    RIKA HANEY        Department: ER  MRN:        [id number]                      Room:        14  Gender:     Female                       Technician: [id number]  :        1975                   Requested By:CHAUNCEY TURCIOS  Order #:    [id number]                    Reading MD: CHAUNCEY TURCIOS MD    Measurements  Intervals                                 Axis  Rate:       104                          P:          30  MA:         172                          QRS:        9  QRSD:       92                           T:          36  QT:         348  QTc:        458    Interpretive Statements  SINUS TACHYCARDIA  BASELINE WANDER IN LEAD(S) III  Compared to ECG 11/16/2018 12:56:06  Junctional tachycardia no longer present    Electronically Signed On 11- 13:36:58 PST by CHAUNCEY TURCIOS MD        All labs reviewed by me.    EKG  See labs. Interpreted by me.     RADIOLOGY  DX-CHEST-PORTABLE (1 VIEW)   Final Result      No acute cardiopulmonary abnormality.        The radiologist's interpretation of all radiological studies have been reviewed by me.    COURSE & MEDICAL DECISION MAKING  Nursing notes, VS, PMSFHx reviewed in chart.    1:11 PM Patient seen and examined at bedside. Patient's heart rate was 155 at this time. Performed vagal maneuver, had patient blow hard on a syringe. Did carotid massage. This brought patient's heart rate down from the 150s to the 120s and 130s. P waves were able to be seen again. Will give patient Lopressor IV and check labs and EKG. Will consult cardiology. Patient will be treated with Lopressor injection 5 mg, ASA. Ordered DX chest, HCG qual serum, magnesium, phosphorus, CBC with differential, CMP, BNP, PT/INR, APTT, lipase, troponin stat, EKG to evaluate her symptoms. The differential diagnoses include but are not limited to: SVT, cardiac ischemia, electrolyte disturbance.    1:27 PM Patient reevaluated at bedside. Performed bedside ultrasound.     2:36 PM Patient reevaluated at bedside. Discussed diagnostic test results as seen above which are overall unrevealing. The patient will be discharged with instructions regarding supportive care and medications. Instructions were given for follow-up with cardiology. Discussed indications for seeking immediate medical attention. Patient was given the opportunity for questions. The patient  understands and agrees.        The patient will return for new or worsening symptoms and is stable at the time of discharge.    The patient is referred to a primary physician for blood pressure management, diabetic screening, and for all other preventative health concerns.      DISPOSITION:  Patient will be discharged home in stable condition.    FOLLOW UP:  Barrington Reynolds M.D.  [address]  [address]  [place] [id number] [phone]    Call in 2 days  to establish care, for recheck      OUTPATIENT MEDICATIONS:  Current Discharge Medication List      START taking these medications    Details   metoprolol SR (TOPROL XL) 25 MG TABLET SR 24 HR Take 1 Tab by mouth every day.  Qty: 30 Tab, Refills: 0              FINAL IMPRESSION  1. Paroxysmal SVT (supraventricular tachycardia) (HCC)          Mathew LOPEZ (Scribe), am scribing for, and in the presence of, Khadijah Acuña M.D..    Electronically signed by: Mathew Harp (Scribe), 11/16/2018    Khadijah LOPEZ M.D. personally performed the services described in this documentation, as scribed by Mathew Harp in my presence, and it is both accurate and complete. C    The note accurately reflects work and decisions made by me.  Khadijah Acuña  11/16/2018  3:09 PM

## 2018-11-16 NOTE — ED TRIAGE NOTES
"Lakesha Wilhelm [facility]  Chief Complaint   Patient presents with   • Shortness of Breath   • Chest Pressure     Pt ambulatory to triage with above complaint. Pt states she had an \"attack\" on Wednesday, but states it only lasted a few minutes. Pt states s/s reoccurred today, but s/s are increasing in severity. Pt tachycardic at 170 in triage. Pt able to speak in full sentences at this time.    BP (!) 169/94   Pulse (!) 170 Comment: verified  Temp 36.6 °C (97.9 °F) (Temporal)   Resp 20   Ht 1.6 m (5' 3\")   Wt 83.1 kg (183 lb 3.2 oz)   SpO2 96%   BMI 32.45 kg/m²     EKG completed in triage, ERP requesting pt be roomed, pt to BL 14 now.  "

## 2019-03-05 ENCOUNTER — OFFICE VISIT (OUTPATIENT)
Dept: URGENT CARE | Facility: CLINIC | Age: 44
End: 2019-03-05
Payer: COMMERCIAL

## 2019-03-05 ENCOUNTER — HOSPITAL ENCOUNTER (OUTPATIENT)
Dept: LAB | Facility: MEDICAL CENTER | Age: 44
End: 2019-03-05
Attending: EMERGENCY MEDICINE
Payer: COMMERCIAL

## 2019-03-05 ENCOUNTER — HOSPITAL ENCOUNTER (OUTPATIENT)
Dept: RADIOLOGY | Facility: MEDICAL CENTER | Age: 44
End: 2019-03-05
Attending: EMERGENCY MEDICINE
Payer: COMMERCIAL

## 2019-03-05 VITALS
SYSTOLIC BLOOD PRESSURE: 122 MMHG | HEART RATE: 74 BPM | RESPIRATION RATE: 16 BRPM | WEIGHT: 183 LBS | DIASTOLIC BLOOD PRESSURE: 74 MMHG | OXYGEN SATURATION: 99 % | HEIGHT: 63 IN | TEMPERATURE: 98.5 F | BODY MASS INDEX: 32.43 KG/M2

## 2019-03-05 DIAGNOSIS — R10.32 LEFT LOWER QUADRANT PAIN: ICD-10-CM

## 2019-03-05 DIAGNOSIS — R42 INTERMITTENT LIGHTHEADEDNESS: ICD-10-CM

## 2019-03-05 DIAGNOSIS — R10.814 LEFT LOWER QUADRANT ABDOMINAL TENDERNESS WITHOUT REBOUND TENDERNESS: ICD-10-CM

## 2019-03-05 DIAGNOSIS — K59.00 CONSTIPATION, UNSPECIFIED CONSTIPATION TYPE: ICD-10-CM

## 2019-03-05 LAB
ALBUMIN SERPL BCP-MCNC: 4.1 G/DL (ref 3.2–4.9)
ALBUMIN/GLOB SERPL: 1.2 G/DL
ALP SERPL-CCNC: 64 U/L (ref 30–99)
ALT SERPL-CCNC: 28 U/L (ref 2–50)
ANION GAP SERPL CALC-SCNC: 6 MMOL/L (ref 0–11.9)
APPEARANCE UR: CLEAR
AST SERPL-CCNC: 27 U/L (ref 12–45)
BASOPHILS # BLD AUTO: 0.5 % (ref 0–1.8)
BASOPHILS # BLD: 0.04 K/UL (ref 0–0.12)
BILIRUB SERPL-MCNC: 0.6 MG/DL (ref 0.1–1.5)
BILIRUB UR STRIP-MCNC: NEGATIVE MG/DL
BUN SERPL-MCNC: 15 MG/DL (ref 8–22)
CALCIUM SERPL-MCNC: 9.2 MG/DL (ref 8.5–10.5)
CHLORIDE SERPL-SCNC: 102 MMOL/L (ref 96–112)
CO2 SERPL-SCNC: 30 MMOL/L (ref 20–33)
COLOR UR AUTO: YELLOW
CREAT SERPL-MCNC: 0.85 MG/DL (ref 0.5–1.4)
EOSINOPHIL # BLD AUTO: 0.09 K/UL (ref 0–0.51)
EOSINOPHIL NFR BLD: 1.2 % (ref 0–6.9)
ERYTHROCYTE [DISTWIDTH] IN BLOOD BY AUTOMATED COUNT: 43.5 FL (ref 35.9–50)
GLOBULIN SER CALC-MCNC: 3.5 G/DL (ref 1.9–3.5)
GLUCOSE SERPL-MCNC: 109 MG/DL (ref 65–99)
GLUCOSE UR STRIP.AUTO-MCNC: NEGATIVE MG/DL
HCT VFR BLD AUTO: 44.3 % (ref 37–47)
HGB BLD-MCNC: 14.8 G/DL (ref 12–16)
IMM GRANULOCYTES # BLD AUTO: 0.02 K/UL (ref 0–0.11)
IMM GRANULOCYTES NFR BLD AUTO: 0.3 % (ref 0–0.9)
INT CON NEG: NEGATIVE
INT CON POS: POSITIVE
KETONES UR STRIP.AUTO-MCNC: NEGATIVE MG/DL
LEUKOCYTE ESTERASE UR QL STRIP.AUTO: NEGATIVE
LYMPHOCYTES # BLD AUTO: 2.6 K/UL (ref 1–4.8)
LYMPHOCYTES NFR BLD: 34.5 % (ref 22–41)
MCH RBC QN AUTO: 31.2 PG (ref 27–33)
MCHC RBC AUTO-ENTMCNC: 33.4 G/DL (ref 33.6–35)
MCV RBC AUTO: 93.5 FL (ref 81.4–97.8)
MONOCYTES # BLD AUTO: 0.53 K/UL (ref 0–0.85)
MONOCYTES NFR BLD AUTO: 7 % (ref 0–13.4)
NEUTROPHILS # BLD AUTO: 4.26 K/UL (ref 2–7.15)
NEUTROPHILS NFR BLD: 56.5 % (ref 44–72)
NITRITE UR QL STRIP.AUTO: NEGATIVE
NRBC # BLD AUTO: 0 K/UL
NRBC BLD-RTO: 0 /100 WBC
PH UR STRIP.AUTO: 6 [PH] (ref 5–8)
PLATELET # BLD AUTO: 252 K/UL (ref 164–446)
PMV BLD AUTO: 8.9 FL (ref 9–12.9)
POC URINE PREGNANCY TEST: NEGATIVE
POTASSIUM SERPL-SCNC: 4.1 MMOL/L (ref 3.6–5.5)
PROT SERPL-MCNC: 7.6 G/DL (ref 6–8.2)
PROT UR QL STRIP: NEGATIVE MG/DL
RBC # BLD AUTO: 4.74 M/UL (ref 4.2–5.4)
RBC UR QL AUTO: NORMAL
SODIUM SERPL-SCNC: 138 MMOL/L (ref 135–145)
SP GR UR STRIP.AUTO: 1.02
UROBILINOGEN UR STRIP-MCNC: 0.2 MG/DL
WBC # BLD AUTO: 7.5 K/UL (ref 4.8–10.8)

## 2019-03-05 PROCEDURE — 85025 COMPLETE CBC W/AUTO DIFF WBC: CPT

## 2019-03-05 PROCEDURE — 81002 URINALYSIS NONAUTO W/O SCOPE: CPT | Performed by: EMERGENCY MEDICINE

## 2019-03-05 PROCEDURE — 74176 CT ABD & PELVIS W/O CONTRAST: CPT

## 2019-03-05 PROCEDURE — 81025 URINE PREGNANCY TEST: CPT | Performed by: EMERGENCY MEDICINE

## 2019-03-05 PROCEDURE — 99214 OFFICE O/P EST MOD 30 MIN: CPT | Performed by: EMERGENCY MEDICINE

## 2019-03-05 PROCEDURE — 36415 COLL VENOUS BLD VENIPUNCTURE: CPT

## 2019-03-05 PROCEDURE — 700117 HCHG RX CONTRAST REV CODE 255: Performed by: EMERGENCY MEDICINE

## 2019-03-05 PROCEDURE — 80053 COMPREHEN METABOLIC PANEL: CPT

## 2019-03-05 RX ORDER — HYDROXYZINE 50 MG/1
50 TABLET, FILM COATED ORAL 3 TIMES DAILY PRN
Refills: 3 | COMMUNITY
Start: 2019-02-23

## 2019-03-05 RX ADMIN — IOHEXOL 50 ML: 240 INJECTION, SOLUTION INTRATHECAL; INTRAVASCULAR; INTRAVENOUS; ORAL at 17:14

## 2019-03-05 ASSESSMENT — ENCOUNTER SYMPTOMS
DIARRHEA: 0
ABDOMINAL PAIN: 1
COUGH: 0
HEADACHES: 0
FOCAL WEAKNESS: 0
VOMITING: 0
PALPITATIONS: 0
SENSORY CHANGE: 0
DIZZINESS: 1
FEVER: 0
SORE THROAT: 0
SHORTNESS OF BREATH: 0
CONSTIPATION: 1
FLATUS: 0
CHILLS: 0
HEMATOCHEZIA: 1
NAUSEA: 1
RECTAL PAIN: 0
BLOATING: 1
FLANK PAIN: 0
LOSS OF CONSCIOUSNESS: 0
ANOREXIA: 1

## 2019-03-05 NOTE — PROGRESS NOTES
Subjective:      Lakesha Fagan is a 43 y.o. female who presents with Dizziness (x3 days, dizziness, (L) arm feels tight) and Constipation (uses enemas to have bowel movements, blood in stool. not sure when last BM was without using enemas)            Constipation   This is a new problem. Episode onset: several months. The problem has been gradually worsening since onset. Associated symptoms include abdominal pain, anorexia, bloating, hematochezia, melena and nausea. Pertinent negatives include no diarrhea, difficulty urinating, fecal incontinence, fever, flatus, rectal pain or vomiting. She has tried enemas for the symptoms. The treatment provided mild relief. There is no history of abdominal surgery or inflammatory bowel disease.   Notes constipation requiring enemas for bowel movements for several months.  Notes malaise, nausea, decreased appetite for the past week.  Yesterday had episode of anxiety and lightheadedness without syncope, recurrent today.  Noted blood with enema today.    Review of Systems   Constitutional: Negative for chills and fever.   HENT: Negative for sore throat.    Respiratory: Negative for cough and shortness of breath.    Cardiovascular: Negative for chest pain and palpitations.   Gastrointestinal: Positive for abdominal pain, anorexia, bloating, constipation, hematochezia, melena and nausea. Negative for diarrhea, flatus, rectal pain and vomiting.   Genitourinary: Positive for frequency. Negative for difficulty urinating, dysuria, flank pain, hematuria and urgency.        No vaginal discharge or bleeding.   Skin: Negative for rash.   Neurological: Positive for dizziness. Negative for sensory change, focal weakness, loss of consciousness and headaches.     PMH:  has a past medical history of Other specified disorder of intestines and Pain.  MEDS:   Current Outpatient Prescriptions:   •  hydrOXYzine HCl (ATARAX) 50 MG Tab, Take  by mouth 3 times a day as needed., Disp: ,  "Rfl: 3  •  metoprolol SR (TOPROL XL) 25 MG TABLET SR 24 HR, Take 1 Tab by mouth every day. (Patient not taking: Reported on 3/5/2019), Disp: 30 Tab, Rfl: 0  •  phenazopyridine (PYRIDIUM) 200 MG Tab, Take 1 Tab by mouth 3 times a day as needed., Disp: 6 Tab, Rfl: 0  •  hydrocodone-acetaminophen (NORCO) 5-325 MG Tab per tablet, Take 1-2 Tabs by mouth every four hours as needed., Disp: , Rfl:   •  morphine SR (MS CONTIN) 60 MG TB12, Take 60 mg by mouth 3 times a day. Indications: Moderate to Severe Chronic Pain, Disp: , Rfl:   ALLERGIES: No Known Allergies  SURGHX:   Past Surgical History:   Procedure Laterality Date   • LUMBAR FUSION POSTERIOR  3/13/2013    Performed by Arleth Garcia M.D. at SURGERY [facility]   • LUMBAR LAMINECTOMY DISKECTOMY  3/13/2013    Performed by Arleth Garcia M.D. at SURGERY [facility]   • LUMBAR DECOMPRESSION  3/13/2013    Performed by Arleth Garcia M.D. at SURGERY [facility]   • CHOLECYSTECTOMY       SOCHX:  reports that she has never smoked. She has never used smokeless tobacco. She reports that she does not drink alcohol or use drugs.  FH: family history is not on file.       Objective:     /74   Pulse 74   Temp 36.9 °C (98.5 °F) (Temporal)   Resp 16   Ht 1.6 m (5' 3\")   Wt 83 kg (183 lb)   SpO2 99%   BMI 32.42 kg/m²      Physical Exam   Constitutional: She is oriented to person, place, and time. She appears well-developed and well-nourished. She is cooperative.  Non-toxic appearance. She does not have a sickly appearance. No distress.   HENT:   Mouth/Throat: Mucous membranes are normal.   Eyes: Conjunctivae are normal. No scleral icterus.   Neck: Phonation normal. Neck supple. No thyromegaly present.   Cardiovascular: Normal rate, regular rhythm and normal heart sounds.   Occasional extrasystoles are present.   No murmur heard.  Pulmonary/Chest: Effort normal and breath sounds normal.   Abdominal: Soft. She exhibits distension. She exhibits no " pulsatile midline mass and no mass. Bowel sounds are increased. There is tenderness in the left lower quadrant. There is guarding. There is no rigidity, no rebound and no CVA tenderness. No hernia.   Genitourinary: Rectal exam shows internal hemorrhoid. Rectal exam shows no external hemorrhoid, no fissure, no mass, anal tone normal and guaiac negative stool.   Neurological: She is alert and oriented to person, place, and time. She displays no tremor. She exhibits normal muscle tone. Gait normal.   Skin: Skin is warm and dry.   Psychiatric: She has a normal mood and affect.     Patient appears stable enough to evaluate as an outpatient; advised n.p.o., go to ED if any worsening condition.     Advised patient by phone of CT report; advised to discontinue laxative/enema use.  Advised may use MiraLAX as needed, plan follow-up with PCP/GI specialist.     Assessment/Plan:     1. Left lower quadrant abdominal tenderness without rebound tenderness  Positive blood- POCT Urinalysis  - CBC WITH DIFFERENTIAL; unremarkable  - Comp Metabolic Panel;     2. Left lower quadrant pain  - CT-ABDOMEN-PELVIS W/O; per radiologist:  No CT explanation for acute left flank pain. No diverticulosis is identified and no focal perisigmoid inflammatory change is seen  No hydronephrosis or urolithiasis  No appendicitis  2.4 cm mixed cystic and partially calcified mass between the left lobe of the liver and the transverse colon is indeterminate. This could reflect sequela of the prior cholecystectomy, a postprocedural hematoma and/or seroma. No lymphadenopathy is seen   but a necrotic node is not excluded. This may be visible by ultrasound just under the costal margin. Ultrasound may therefore prove useful for follow-up purposes.  Cholecystectomy.  negative- POCT Pregnancy  negative- POCT Occult Blood Stool    3. Intermittent lightheadedness  4. Constipation, unspecified constipation type

## 2019-03-06 NOTE — PATIENT INSTRUCTIONS
Constipation, Adult  Constipation is when a person has fewer bowel movements in a week than normal, has difficulty having a bowel movement, or has stools that are dry, hard, or larger than normal. Constipation may be caused by an underlying condition. It may become worse with age if a person takes certain medicines and does not take in enough fluids.  Follow these instructions at home:  Eating and drinking  · Eat foods that have a lot of fiber, such as fresh fruits and vegetables, whole grains, and beans.  · Limit foods that are high in fat, low in fiber, or overly processed, such as french fries, hamburgers, cookies, candies, and soda.  · Drink enough fluid to keep your urine clear or pale yellow.  General instructions  · Exercise regularly or as told by your health care provider.  · Go to the restroom when you have the urge to go. Do not hold it in.  · Take over-the-counter and prescription medicines only as told by your health care provider. These include any fiber supplements.  · Practice pelvic floor retraining exercises, such as deep breathing while relaxing the lower abdomen and pelvic floor relaxation during bowel movements.  · Watch your condition for any changes.  · Keep all follow-up visits as told by your health care provider. This is important.  Contact a health care provider if:  · You have pain that gets worse.  · You have a fever.  · You do not have a bowel movement after 4 days.  · You vomit.  · You are not hungry.  · You lose weight.  · You are bleeding from the anus.  · You have thin, pencil-like stools.  Get help right away if:  · You have a fever and your symptoms suddenly get worse.  · You leak stool or have blood in your stool.  · Your abdomen is bloated.  · You have severe pain in your abdomen.  · You feel dizzy or you faint.  This information is not intended to replace advice given to you by your health care provider. Make sure you discuss any questions you have with your health care  provider.  Document Released: 09/15/2005 Document Revised: 07/07/2017 Document Reviewed: 06/07/2017  [employer] Interactive Patient Education © 2017 [employer]

## 2019-04-30 ENCOUNTER — HOSPITAL ENCOUNTER (OUTPATIENT)
Dept: RADIOLOGY | Facility: MEDICAL CENTER | Age: 44
End: 2019-04-30
Attending: PHYSICIAN ASSISTANT
Payer: COMMERCIAL

## 2019-04-30 DIAGNOSIS — R19.4 ALTERED BOWEL HABITS: ICD-10-CM

## 2019-04-30 DIAGNOSIS — K59.00 CONSTIPATION, UNSPECIFIED CONSTIPATION TYPE: ICD-10-CM

## 2019-04-30 DIAGNOSIS — R93.5 ABNORMAL ABDOMINAL ULTRASOUND: ICD-10-CM

## 2019-04-30 DIAGNOSIS — R10.13 ABDOMINAL PAIN, EPIGASTRIC: ICD-10-CM

## 2019-04-30 DIAGNOSIS — R11.2 NAUSEA AND VOMITING, INTRACTABILITY OF VOMITING NOT SPECIFIED, UNSPECIFIED VOMITING TYPE: ICD-10-CM

## 2019-04-30 PROCEDURE — 76705 ECHO EXAM OF ABDOMEN: CPT

## 2019-05-20 ENCOUNTER — HOSPITAL ENCOUNTER (OUTPATIENT)
Dept: RADIOLOGY | Facility: MEDICAL CENTER | Age: 44
End: 2019-05-20
Attending: NURSE PRACTITIONER
Payer: COMMERCIAL

## 2019-05-20 DIAGNOSIS — R92.0 MAMMOGRAPHIC MICROCALCIFICATION: ICD-10-CM

## 2019-05-20 PROCEDURE — G0279 TOMOSYNTHESIS, MAMMO: HCPCS

## 2019-05-20 PROCEDURE — 76642 ULTRASOUND BREAST LIMITED: CPT | Mod: RT

## 2019-09-26 ENCOUNTER — HOSPITAL ENCOUNTER (EMERGENCY)
Facility: MEDICAL CENTER | Age: 44
End: 2019-09-26
Attending: EMERGENCY MEDICINE
Payer: COMMERCIAL

## 2019-09-26 ENCOUNTER — APPOINTMENT (OUTPATIENT)
Dept: RADIOLOGY | Facility: MEDICAL CENTER | Age: 44
End: 2019-09-26
Attending: EMERGENCY MEDICINE
Payer: COMMERCIAL

## 2019-09-26 VITALS
WEIGHT: 187.39 LBS | BODY MASS INDEX: 33.2 KG/M2 | HEART RATE: 63 BPM | OXYGEN SATURATION: 96 % | RESPIRATION RATE: 16 BRPM | SYSTOLIC BLOOD PRESSURE: 119 MMHG | HEIGHT: 63 IN | TEMPERATURE: 97.8 F | DIASTOLIC BLOOD PRESSURE: 67 MMHG

## 2019-09-26 DIAGNOSIS — R10.13 EPIGASTRIC ABDOMINAL PAIN: ICD-10-CM

## 2019-09-26 DIAGNOSIS — R53.83 MALAISE AND FATIGUE: ICD-10-CM

## 2019-09-26 DIAGNOSIS — R53.81 MALAISE AND FATIGUE: ICD-10-CM

## 2019-09-26 DIAGNOSIS — R11.0 NAUSEA: ICD-10-CM

## 2019-09-26 LAB
ALBUMIN SERPL BCP-MCNC: 4.3 G/DL (ref 3.2–4.9)
ALBUMIN/GLOB SERPL: 1.2 G/DL
ALP SERPL-CCNC: 96 U/L (ref 30–99)
ALT SERPL-CCNC: 24 U/L (ref 2–50)
ANION GAP SERPL CALC-SCNC: 13 MMOL/L (ref 0–11.9)
APPEARANCE UR: CLEAR
AST SERPL-CCNC: 30 U/L (ref 12–45)
BACTERIA #/AREA URNS HPF: ABNORMAL /HPF
BASOPHILS # BLD AUTO: 0.6 % (ref 0–1.8)
BASOPHILS # BLD: 0.04 K/UL (ref 0–0.12)
BILIRUB SERPL-MCNC: 0.6 MG/DL (ref 0.1–1.5)
BILIRUB UR QL STRIP.AUTO: NEGATIVE
BUN SERPL-MCNC: 13 MG/DL (ref 8–22)
CALCIUM SERPL-MCNC: 9 MG/DL (ref 8.4–10.2)
CHLORIDE SERPL-SCNC: 99 MMOL/L (ref 96–112)
CO2 SERPL-SCNC: 25 MMOL/L (ref 20–33)
COLOR UR: YELLOW
CREAT SERPL-MCNC: 0.79 MG/DL (ref 0.5–1.4)
EKG IMPRESSION: NORMAL
EOSINOPHIL # BLD AUTO: 0.03 K/UL (ref 0–0.51)
EOSINOPHIL NFR BLD: 0.5 % (ref 0–6.9)
EPI CELLS #/AREA URNS HPF: ABNORMAL /HPF
ERYTHROCYTE [DISTWIDTH] IN BLOOD BY AUTOMATED COUNT: 39.8 FL (ref 35.9–50)
FLUAV RNA SPEC QL NAA+PROBE: NEGATIVE
FLUBV RNA SPEC QL NAA+PROBE: NEGATIVE
GLOBULIN SER CALC-MCNC: 3.6 G/DL (ref 1.9–3.5)
GLUCOSE SERPL-MCNC: 99 MG/DL (ref 65–99)
GLUCOSE UR STRIP.AUTO-MCNC: NEGATIVE MG/DL
HCG SERPL QL: NEGATIVE
HCT VFR BLD AUTO: 43.6 % (ref 37–47)
HGB BLD-MCNC: 14.7 G/DL (ref 12–16)
IMM GRANULOCYTES # BLD AUTO: 0.02 K/UL (ref 0–0.11)
IMM GRANULOCYTES NFR BLD AUTO: 0.3 % (ref 0–0.9)
KETONES UR STRIP.AUTO-MCNC: 15 MG/DL
LEUKOCYTE ESTERASE UR QL STRIP.AUTO: NEGATIVE
LIPASE SERPL-CCNC: 14 U/L (ref 7–58)
LYMPHOCYTES # BLD AUTO: 1.75 K/UL (ref 1–4.8)
LYMPHOCYTES NFR BLD: 28.3 % (ref 22–41)
MCH RBC QN AUTO: 29.1 PG (ref 27–33)
MCHC RBC AUTO-ENTMCNC: 33.7 G/DL (ref 33.6–35)
MCV RBC AUTO: 86.2 FL (ref 81.4–97.8)
MICRO URNS: ABNORMAL
MONOCYTES # BLD AUTO: 0.29 K/UL (ref 0–0.85)
MONOCYTES NFR BLD AUTO: 4.7 % (ref 0–13.4)
NEUTROPHILS # BLD AUTO: 4.05 K/UL (ref 2–7.15)
NEUTROPHILS NFR BLD: 65.6 % (ref 44–72)
NITRITE UR QL STRIP.AUTO: NEGATIVE
NRBC # BLD AUTO: 0 K/UL
NRBC BLD-RTO: 0 /100 WBC
PH UR STRIP.AUTO: 6 [PH] (ref 5–8)
PLATELET # BLD AUTO: 238 K/UL (ref 164–446)
PMV BLD AUTO: 8.5 FL (ref 9–12.9)
POTASSIUM SERPL-SCNC: 3.8 MMOL/L (ref 3.6–5.5)
PROT SERPL-MCNC: 7.9 G/DL (ref 6–8.2)
PROT UR QL STRIP: NEGATIVE MG/DL
RBC # BLD AUTO: 5.06 M/UL (ref 4.2–5.4)
RBC # URNS HPF: ABNORMAL /HPF
RBC UR QL AUTO: ABNORMAL
SODIUM SERPL-SCNC: 137 MMOL/L (ref 135–145)
SP GR UR REFRACTOMETRY: 1.02
SP GR UR STRIP.AUTO: >=1.03
TROPONIN T SERPL-MCNC: <6 NG/L (ref 6–19)
WBC # BLD AUTO: 6.2 K/UL (ref 4.8–10.8)
WBC #/AREA URNS HPF: ABNORMAL /HPF

## 2019-09-26 PROCEDURE — 87502 INFLUENZA DNA AMP PROBE: CPT

## 2019-09-26 PROCEDURE — 83690 ASSAY OF LIPASE: CPT

## 2019-09-26 PROCEDURE — 80053 COMPREHEN METABOLIC PANEL: CPT

## 2019-09-26 PROCEDURE — 36415 COLL VENOUS BLD VENIPUNCTURE: CPT

## 2019-09-26 PROCEDURE — 84703 CHORIONIC GONADOTROPIN ASSAY: CPT

## 2019-09-26 PROCEDURE — 76705 ECHO EXAM OF ABDOMEN: CPT

## 2019-09-26 PROCEDURE — 84484 ASSAY OF TROPONIN QUANT: CPT

## 2019-09-26 PROCEDURE — 96375 TX/PRO/DX INJ NEW DRUG ADDON: CPT

## 2019-09-26 PROCEDURE — 93005 ELECTROCARDIOGRAM TRACING: CPT | Performed by: EMERGENCY MEDICINE

## 2019-09-26 PROCEDURE — 96374 THER/PROPH/DIAG INJ IV PUSH: CPT

## 2019-09-26 PROCEDURE — 700111 HCHG RX REV CODE 636 W/ 250 OVERRIDE (IP): Performed by: EMERGENCY MEDICINE

## 2019-09-26 PROCEDURE — 81001 URINALYSIS AUTO W/SCOPE: CPT

## 2019-09-26 PROCEDURE — 85025 COMPLETE CBC W/AUTO DIFF WBC: CPT

## 2019-09-26 PROCEDURE — 99285 EMERGENCY DEPT VISIT HI MDM: CPT

## 2019-09-26 RX ORDER — METOCLOPRAMIDE HYDROCHLORIDE 5 MG/ML
10 INJECTION INTRAMUSCULAR; INTRAVENOUS ONCE
Status: COMPLETED | OUTPATIENT
Start: 2019-09-26 | End: 2019-09-26

## 2019-09-26 RX ORDER — RANITIDINE 150 MG/1
150 TABLET ORAL 2 TIMES DAILY
Qty: 14 TAB | Refills: 0 | Status: ON HOLD | OUTPATIENT
Start: 2019-09-26 | End: 2022-05-11

## 2019-09-26 RX ORDER — DIPHENHYDRAMINE HYDROCHLORIDE 50 MG/ML
25 INJECTION INTRAMUSCULAR; INTRAVENOUS ONCE
Status: COMPLETED | OUTPATIENT
Start: 2019-09-26 | End: 2019-09-26

## 2019-09-26 RX ORDER — ONDANSETRON 4 MG/1
4 TABLET, ORALLY DISINTEGRATING ORAL EVERY 6 HOURS PRN
Qty: 5 TAB | Refills: 0 | Status: ON HOLD | OUTPATIENT
Start: 2019-09-26 | End: 2022-05-11

## 2019-09-26 RX ORDER — BUSPIRONE HYDROCHLORIDE 15 MG/1
15 TABLET ORAL 2 TIMES DAILY PRN
Status: SHIPPED | COMMUNITY
End: 2024-02-15

## 2019-09-26 RX ADMIN — DIPHENHYDRAMINE HYDROCHLORIDE 25 MG: 50 INJECTION INTRAMUSCULAR; INTRAVENOUS at 16:54

## 2019-09-26 RX ADMIN — METOCLOPRAMIDE 10 MG: 5 INJECTION, SOLUTION INTRAMUSCULAR; INTRAVENOUS at 16:55

## 2019-09-26 NOTE — ED NOTES
Med Rec completed per patient   Allergies reviewed  No ORAL antibiotics in last 14 days    COULD NOT VERIFY METHADONE

## 2019-09-26 NOTE — ED PROVIDER NOTES
"ED Provider Note    CHIEF COMPLAINT  Chief Complaint   Patient presents with   • Flu Like Symptoms     Chills, fatigue   • Abdominal Pain     epigastric since last night along with above complaints   • Nausea   • Constipation     last bm x3 days ago        [facility]    Primary care provider: KENISHA Hackett   History obtained from: Patient  History limited by: None     Lakesha Fagan is a 44 y.o. female who presents to the ED with  complaining of epigastric abdominal pain since last night described as \"it just hurts\" without radiation or pain anywhere else.  She has also had chills and sweats along with nausea without vomiting.  She reports not having a bowel movement for 3 days which is not uncommon for her and she uses over-the-counter medication at times to help with her constipation.  She also reports slight burning with urination at times.  She denies possibility of pregnancy.  She has not noticed anything that has helped with her symptoms.  Patient with history of cholecystectomy.  She also has a known calcified mass near the liver on previous ultrasound.    REVIEW OF SYSTEMS  Please see HPI for pertinent positives/negatives.  All other systems reviewed and are negative.     PAST MEDICAL HISTORY  Past Medical History:   Diagnosis Date   • Other specified disorder of intestines    • Pain         SURGICAL HISTORY  Past Surgical History:   Procedure Laterality Date   • LUMBAR FUSION POSTERIOR  3/13/2013    Performed by Arleth Garcia M.D. at [facility]   • LUMBAR LAMINECTOMY DISKECTOMY  3/13/2013    Performed by Arleth Garcia M.D. at [facility]   • LUMBAR DECOMPRESSION  3/13/2013    Performed by Arleth Garcia M.D. at [facility]   • CHOLECYSTECTOMY          SOCIAL HISTORY  Social History     Tobacco Use   • Smoking status: Never Smoker   • Smokeless tobacco: Never Used   Substance and Sexual Activity   • Alcohol use: No   • Drug use: No   • " "Sexual activity: Not on file        FAMILY HISTORY  History reviewed. No pertinent family history.     CURRENT MEDICATIONS  Home Medications     Reviewed by Lizzy Walsh (Pharmacy Tech) on 09/26/19 at 1557  Med List Status: Complete   Medication Last Dose Status   busPIRone (BUSPAR) 15 MG tablet 9/25/2019 Active   hydrOXYzine HCl (ATARAX) 50 MG Tab 9/25/2019 Active   METHADONE HCL PO 9/26/2019 Active                 ALLERGIES  No Known Allergies     PHYSICAL EXAM  VITAL SIGNS: /67   Pulse 63   Temp 36.6 °C (97.8 °F) (Temporal)   Resp 16   Ht 1.6 m (5' 3\")   Wt 85 kg (187 lb 6.3 oz)   SpO2 96%   BMI 33.19 kg/m²  @LAKISHA[[id number]::@     Pulse ox interpretation: 92% I interpret this pulse ox as normal     Cardiac monitor interpretation: Sinus rhythm with heart rate in the 70s as interpreted by me.  The patient presented with epigastric abdominal pain and cardiac monitor was ordered to monitor for dysrhythmia.    Constitutional: Well developed, well nourished, alert in no apparent distress, nontoxic appearance    HENT: No external signs of trauma, normocephalic, oropharynx moist and clear, nose normal    Eyes: PERRL, conjunctiva without erythema, no discharge, no icterus    Neck: Soft and supple, trachea midline, no stridor, no tenderness, no LAD, no JVD, good ROM    Cardiovascular: Regular rate and rhythm, no murmurs/rubs/gallops, strong distal pulses and good perfusion    Thorax & Lungs: No respiratory distress, CTAB   Abdomen: Soft, mild epigastric tenderness to palpation, nondistended, no guarding, no rebound, normal BS    Back: No CVAT    Extremities: No cyanosis, no edema, no gross deformity, good ROM, no tenderness, intact distal pulses with brisk cap refill    Skin: Warm, dry, no pallor/cyanosis, no rash noted    Lymphatic: No lymphadenopathy noted    Neuro: A/O times 3, no focal deficits noted    Psychiatric: Cooperative, normal mood and affect, normal judgement, appropriate for clinical " situation        DIAGNOSTIC STUDIES / PROCEDURES    EKG  12 Lead EKG obtained at 1716 and interpreted by me:   Rate: 72  Rhythm: Sinus rhythm   Ectopy: None  Intervals: Normal   Axis: LAD  Q Waves: None   QRS: Late precordial R wave transition  ST segments: Normal  T Waves: T wave inversion in septal leads    Clinical Impression: Sinus rhythm with nonspecific T wave changes  Compared to November 16, 2018.      LABS  All labs reviewed by me.     Results for orders placed or performed during the hospital encounter of 09/26/19   CBC WITH DIFFERENTIAL   Result Value Ref Range    WBC 6.2 4.8 - 10.8 K/uL    RBC 5.06 4.20 - 5.40 M/uL    Hemoglobin 14.7 12.0 - 16.0 g/dL    Hematocrit 43.6 37.0 - 47.0 %    MCV 86.2 81.4 - 97.8 fL    MCH 29.1 27.0 - 33.0 pg    MCHC 33.7 33.6 - 35.0 g/dL    RDW 39.8 35.9 - 50.0 fL    Platelet Count 238 164 - 446 K/uL    MPV 8.5 (L) 9.0 - 12.9 fL    Neutrophils-Polys 65.60 44.00 - 72.00 %    Lymphocytes 28.30 22.00 - 41.00 %    Monocytes 4.70 0.00 - 13.40 %    Eosinophils 0.50 0.00 - 6.90 %    Basophils 0.60 0.00 - 1.80 %    Immature Granulocytes 0.30 0.00 - 0.90 %    Nucleated RBC 0.00 /100 WBC    Neutrophils (Absolute) 4.05 2.00 - 7.15 K/uL    Lymphs (Absolute) 1.75 1.00 - 4.80 K/uL    Monos (Absolute) 0.29 0.00 - 0.85 K/uL    Eos (Absolute) 0.03 0.00 - 0.51 K/uL    Baso (Absolute) 0.04 0.00 - 0.12 K/uL    Immature Granulocytes (abs) 0.02 0.00 - 0.11 K/uL    NRBC (Absolute) 0.00 K/uL   COMP METABOLIC PANEL   Result Value Ref Range    Sodium 137 135 - 145 mmol/L    Potassium 3.8 3.6 - 5.5 mmol/L    Chloride 99 96 - 112 mmol/L    Co2 25 20 - 33 mmol/L    Anion Gap 13.0 (H) 0.0 - 11.9    Glucose 99 65 - 99 mg/dL    Bun 13 8 - 22 mg/dL    Creatinine 0.79 0.50 - 1.40 mg/dL    Calcium 9.0 8.4 - 10.2 mg/dL    AST(SGOT) 30 12 - 45 U/L    ALT(SGPT) 24 2 - 50 U/L    Alkaline Phosphatase 96 30 - 99 U/L    Total Bilirubin 0.6 0.1 - 1.5 mg/dL    Albumin 4.3 3.2 - 4.9 g/dL    Total Protein 7.9 6.0 - 8.2  g/dL    Globulin 3.6 (H) 1.9 - 3.5 g/dL    A-G Ratio 1.2 g/dL   LIPASE   Result Value Ref Range    Lipase 14 7 - 58 U/L   URINALYSIS,CULTURE IF INDICATED   Result Value Ref Range    Color Yellow     Character Clear     Specific Gravity >=1.030 <1.035    Ph 6.0 5.0 - 8.0    Glucose Negative Negative mg/dL    Ketones 15 (A) Negative mg/dL    Protein Negative Negative mg/dL    Bilirubin Negative Negative    Nitrite Negative Negative    Leukocyte Esterase Negative Negative    Occult Blood Trace (A) Negative    Micro Urine Req Microscopic    Influenza A/B By PCR (Adult - Flu Only)   Result Value Ref Range    Influenza virus A RNA Negative Negative    Influenza virus B, PCR Negative Negative   ESTIMATED GFR   Result Value Ref Range    GFR If African American >60 >60 mL/min/1.73 m 2    GFR If Non African American >60 >60 mL/min/1.73 m 2   BETA-HCG QUALITATIVE SERUM   Result Value Ref Range    Beta-Hcg Qualitative Serum Negative Negative   TROPONIN   Result Value Ref Range    Troponin T <6 6 - 19 ng/L   REFRACTOMETER SG   Result Value Ref Range    Specific Gravity 1.025    URINE MICROSCOPIC (W/UA)   Result Value Ref Range    WBC Rare /hpf    RBC 5-10 (A) /hpf    Bacteria Few (A) None /hpf    Epithelial Cells Few Few /hpf   EKG (NOW)   Result Value Ref Range    Report       [facility] Emergency Dept.    Test Date:  2019  Pt Name:    RIKA HANEY        Department: [facility]  MRN:        [id number]                      [facility]   Gender:     Female                       Technician: MARIYA  :        1975                   Requested By:YANET BLANKENSHIP  Order #:    [id number]                    Reading MD:    Measurements  Intervals                                Axis  Rate:       72                           P:          13  TN:         160                          QRS:        -16  QRSD:       94                           T:          6  QT:         428  QTc:         469    Interpretive Statements  SINUS RHYTHM  BORDERLINE LEFT AXIS DEVIATION  BORDERLINE T ABNORMALITIES, DIFFUSE LEADS  Compared to ECG 11/16/2018 13:32:06  T-wave abnormality now present  Sinus tachycardia no longer present          RADIOLOGY  The radiologist's interpretation of all radiological studies have been reviewed by me.     US-RUQ   Final Result      Echogenic liver consistent with fatty infiltration.      Otherwise unremarkable ultrasound but evaluation limited by patient body habitus and bowel gas.             COURSE & MEDICAL DECISION MAKING  Nursing notes, VS, PMSFHx reviewed in chart.     Review of past medical records shows the patient was last seen at [facility] ED November 16, 2018 for chest pressure and shortness of breath.  Work-up was unremarkable.  Patient with CT abdomen/pelvis on March 5, 2019 without evidence of acute abnormality except for possible calcified mass between the left lobe of the liver and the transverse colon.  Patient with abdominal ultrasound on April 30, 2019 showing the calcified mass in the right upper quadrant with possibilities including calcified hematoma or calcified postoperative fluid collection.      Differential diagnoses considered include but are not limited to: AMI, ileus, pancreatitis, PUD, gastritis, GERD, KS/renal colic, colitis, constipation, muscle strain       History and physical exam as above.  EKG showed sinus rhythm with nonspecific T wave changes.  However, her troponin is negative and given her constant pain since last night, low clinical suspicion for acute ACS as the etiology for her epigastric abdominal pain.  Labs were fairly unremarkable.  Ultrasound with above findings.  Patient was treated with Reglan and Benadryl and subsequently tolerated oral fluids without difficulty.  I discussed the findings with the patient and her family.  She reports feeling better.  This is a pleasant well-appearing patient in no acute distress and nontoxic in  appearance.  No signs of acute abdomen on recheck to suggest a surgical emergency.  Low clinical suspicion at this time for acute serious pathology given the history/exam/findings.  She was advised on outpatient follow-up for further evaluation and given return to ED precautions.  She will be discharged home with prescription of Zofran and Zantac.  Patient verbalized understanding and agreed with plan of care with no further questions or concerns.      The patient is referred to a primary physician for blood pressure management, diabetic screening, and for all other preventative health concerns.       FINAL IMPRESSION  1. Epigastric abdominal pain Acute   2. Nausea Acute   3. Malaise and fatigue Acute          DISPOSITION  Patient will be discharged home in stable condition.       FOLLOW UP  NOREEN HackettN.  [address]  [address]  [facility] [id number] [phone]    Call in 1 day      [facility], Emergency Dept  [address]  [facility] [id number] [phone]    If symptoms worsen         OUTPATIENT MEDICATIONS  Discharge Medication List as of 9/26/2019  6:53 PM      START taking these medications    Details   ondansetron (ZOFRAN ODT) 4 MG TABLET DISPERSIBLE Take 1 Tab by mouth every 6 hours as needed., Disp-5 Tab, R-0, Print Rx Paper      raNITidine (ZANTAC) 150 MG Tab Take 1 Tab by mouth 2 times a day., Disp-14 Tab, R-0, Print Rx Paper                Electronically signed by: Dinesh Hung, 9/26/2019 4:19 PM      Portions of this record were made with voice recognition software.  Despite my review, spelling/grammar/context errors may still remain.  Interpretation of this chart should be taken in this context.

## 2019-09-26 NOTE — ED TRIAGE NOTES
"Chief Complaint   Patient presents with   • Flu Like Symptoms     Chills, fatigue   • Abdominal Pain     epigastric since last night along with above complaints   • Nausea   • Constipation     last bm x3 days ago     /87   Pulse 83   Temp 36.5 °C (97.7 °F) (Oral)   Resp 18   Ht 1.6 m (5' 3\")   Wt 85 kg (187 lb 6.3 oz)   SpO2 96%   BMI 33.19 kg/m²     Pt informed of wait times. Educated on triage process.  Asked to return to triage RN for any new or worsening of symptoms. Thanked for patience.  "

## 2019-09-27 NOTE — ED NOTES
PO challenge in progress.  Water is provided. Pt is instructed to take small sips, and report any adverse symptoms.  Verbalizes understanding.

## 2019-09-27 NOTE — ED NOTES
Discharge instructions provided.  Pt verbalized the understanding of discharge instructions to follow up with PCP and to return to ER if condition worsens.  Pt ambulated out of ER without difficulty.  rx 0

## 2020-11-28 ENCOUNTER — OFFICE VISIT (OUTPATIENT)
Dept: URGENT CARE | Facility: CLINIC | Age: 45
End: 2020-11-28
Payer: COMMERCIAL

## 2020-11-28 ENCOUNTER — HOSPITAL ENCOUNTER (OUTPATIENT)
Facility: MEDICAL CENTER | Age: 45
End: 2020-11-28
Attending: STUDENT IN AN ORGANIZED HEALTH CARE EDUCATION/TRAINING PROGRAM
Payer: COMMERCIAL

## 2020-11-28 VITALS
SYSTOLIC BLOOD PRESSURE: 112 MMHG | DIASTOLIC BLOOD PRESSURE: 70 MMHG | WEIGHT: 202.8 LBS | RESPIRATION RATE: 18 BRPM | OXYGEN SATURATION: 94 % | HEART RATE: 83 BPM | HEIGHT: 63 IN | BODY MASS INDEX: 35.93 KG/M2 | TEMPERATURE: 98.2 F

## 2020-11-28 DIAGNOSIS — Z20.828 CONTACT WITH OR EXPOSURE TO VIRAL DISEASE: ICD-10-CM

## 2020-11-28 DIAGNOSIS — Z20.822 COVID-19 RULED OUT: ICD-10-CM

## 2020-11-28 DIAGNOSIS — Z71.89 COUNSELED ABOUT COVID-19 VIRUS INFECTION: ICD-10-CM

## 2020-11-28 DIAGNOSIS — R30.0 DYSURIA: ICD-10-CM

## 2020-11-28 DIAGNOSIS — J06.9 VIRAL URI WITH COUGH: ICD-10-CM

## 2020-11-28 LAB
APPEARANCE UR: CLEAR
BILIRUB UR STRIP-MCNC: NEGATIVE MG/DL
COLOR UR AUTO: YELLOW
GLUCOSE UR STRIP.AUTO-MCNC: NEGATIVE MG/DL
KETONES UR STRIP.AUTO-MCNC: NEGATIVE MG/DL
LEUKOCYTE ESTERASE UR QL STRIP.AUTO: NEGATIVE
NITRITE UR QL STRIP.AUTO: NEGATIVE
PH UR STRIP.AUTO: 5.5 [PH] (ref 5–8)
PROT UR QL STRIP: NEGATIVE MG/DL
RBC UR QL AUTO: NORMAL
SP GR UR STRIP.AUTO: 1.02
UROBILINOGEN UR STRIP-MCNC: 0.2 MG/DL

## 2020-11-28 PROCEDURE — U0003 INFECTIOUS AGENT DETECTION BY NUCLEIC ACID (DNA OR RNA); SEVERE ACUTE RESPIRATORY SYNDROME CORONAVIRUS 2 (SARS-COV-2) (CORONAVIRUS DISEASE [COVID-19]), AMPLIFIED PROBE TECHNIQUE, MAKING USE OF HIGH THROUGHPUT TECHNOLOGIES AS DESCRIBED BY CMS-2020-01-R: HCPCS

## 2020-11-28 PROCEDURE — 99214 OFFICE O/P EST MOD 30 MIN: CPT | Mod: CS | Performed by: STUDENT IN AN ORGANIZED HEALTH CARE EDUCATION/TRAINING PROGRAM

## 2020-11-28 PROCEDURE — 81002 URINALYSIS NONAUTO W/O SCOPE: CPT | Performed by: STUDENT IN AN ORGANIZED HEALTH CARE EDUCATION/TRAINING PROGRAM

## 2020-11-28 ASSESSMENT — FIBROSIS 4 INDEX: FIB4 SCORE: 1.13

## 2020-11-28 NOTE — PROGRESS NOTES
Subjective:   CHIEF COMPLAINT  Chief Complaint   Patient presents with   • Coronavirus Screening     x 4 days, headache, loss of smell, exposure   • Abdominal Pain       HPI  Lakesha Fagan is a 45 y.o. female who presents with a chief complaint of abdominal pain, diarrhea, runny nose and sore throat for approximately 4 days. She had 1 bout of diarrhea yesterday and one this morning.  No vomiting.  Abdominal pain is described as a crampy discomfort.  There are no alleviating or aggravating factors.   Says her son and daughter-in-law recently tested positive for Covid.  She admits associated symptoms of loss of sense of smell, body aches and shortness of breath.  She also reports experiencing some dysuria.  She is not experiencing any cough, chest pain, wheezing.       REVIEW OF SYSTEMS  General: no fever or chills  GI: no nausea or vomiting  See HPI for further details.     PAST MEDICAL HISTORY   has a past medical history of Hypertension, Other specified disorder of intestines, and Pain.    SURGICAL HISTORY   has a past surgical history that includes cholecystectomy; lumbar fusion posterior (3/13/2013); lumbar laminectomy diskectomy (3/13/2013); and lumbar decompression (3/13/2013).    ALLERGIES  No Known Allergies    CURRENT MEDICATIONS  Home Medications     Reviewed by Shade Coles D.O. (Physician) on 11/28/20 at 1146  Med List Status: <None>   Medication Last Dose Status   busPIRone (BUSPAR) 15 MG tablet Taking Active   hydrOXYzine HCl (ATARAX) 50 MG Tab Taking Active   METHADONE HCL PO Taking Active   ondansetron (ZOFRAN ODT) 4 MG TABLET DISPERSIBLE Not Taking Active   raNITidine (ZANTAC) 150 MG Tab Not Taking Active                SOCIAL HISTORY  Social History     Tobacco Use   • Smoking status: Never Smoker   • Smokeless tobacco: Never Used   Substance and Sexual Activity   • Alcohol use: No   • Drug use: No   • Sexual activity: Not on file       FAMILY HISTORY  No family history on  "file.       Objective:   PHYSICAL EXAM  VITAL SIGNS: /70 (BP Location: Left arm)   Pulse 83   Temp 36.8 °C (98.2 °F)   Resp 18   Ht 1.6 m (5' 3\")   Wt 92 kg (202 lb 12.8 oz)   SpO2 94%   BMI 35.92 kg/m²     Gen: no acute distress, normal voice  Skin: dry, intact, moist mucosal membranes  Abdomen: Very mild tenderness to palpation in the right and left lower quadrants without any rebound or guarding.  Normal bowel sounds x4  Lungs: CTAB w/ symmetric expansion  CV: RRR w/o murmurs or clicks  Psych: normal affect, normal judgement, alert, awake    UA demonstrated trace blood.  No leukocytes or nitrites.    Assessment/Plan:     1. Dysuria  POCT Urinalysis   2. Viral URI with cough  COVID/SARS COV-2 PCR   3. Contact with or exposure to viral disease  COVID/SARS COV-2 PCR   4. COVID-19 ruled out  COVID/SARS COV-2 PCR   5. Counseled about COVID-19 virus infection     Signs and symptoms are consistent with a viral upper respiratory tract infection.  Urinalysis was unremarkable  -Ordered Covid.  Results will be sent through [facility].  -Instructed to quarantine until Covid results have been returned  -Encouraged hydration and symptomatic treatment with Tylenol/ibuprofen prn  -Pt was in full understanding and agreement with the plan.    Differential diagnosis, natural history, supportive care, and indications for immediate follow-up discussed. All questions answered. Patient agrees with the plan of care.    Follow-up as needed if symptoms worsen or fail to improve to PCP, Urgent care or Emergency Room.       Please note that this dictation was created using voice recognition software. I have made a reasonable attempt to correct obvious errors, but I expect that there are errors of grammar and possibly content that I did not discover before finalizing the note.  "

## 2020-11-29 DIAGNOSIS — Z20.828 CONTACT WITH OR EXPOSURE TO VIRAL DISEASE: ICD-10-CM

## 2020-11-29 DIAGNOSIS — J06.9 VIRAL URI WITH COUGH: ICD-10-CM

## 2020-11-29 DIAGNOSIS — Z20.822 COVID-19 RULED OUT: ICD-10-CM

## 2020-11-30 LAB
COVID ORDER STATUS COVID19: NORMAL
SARS-COV-2 RNA RESP QL NAA+PROBE: NOTDETECTED
SPECIMEN SOURCE: NORMAL

## 2021-07-02 ENCOUNTER — HOSPITAL ENCOUNTER (EMERGENCY)
Facility: MEDICAL CENTER | Age: 46
End: 2021-07-02
Attending: EMERGENCY MEDICINE
Payer: COMMERCIAL

## 2021-07-02 VITALS
HEIGHT: 63 IN | RESPIRATION RATE: 15 BRPM | HEART RATE: 75 BPM | OXYGEN SATURATION: 96 % | BODY MASS INDEX: 35.98 KG/M2 | DIASTOLIC BLOOD PRESSURE: 72 MMHG | WEIGHT: 203.04 LBS | TEMPERATURE: 98.1 F | SYSTOLIC BLOOD PRESSURE: 99 MMHG

## 2021-07-02 DIAGNOSIS — R10.13 EPIGASTRIC ABDOMINAL PAIN: ICD-10-CM

## 2021-07-02 LAB
ALBUMIN SERPL BCP-MCNC: 3.9 G/DL (ref 3.2–4.9)
ALBUMIN/GLOB SERPL: 1.3 G/DL
ALP SERPL-CCNC: 110 U/L (ref 30–99)
ALT SERPL-CCNC: 11 U/L (ref 2–50)
ANION GAP SERPL CALC-SCNC: 8 MMOL/L (ref 7–16)
AST SERPL-CCNC: 15 U/L (ref 12–45)
BASOPHILS # BLD AUTO: 0.7 % (ref 0–1.8)
BASOPHILS # BLD: 0.06 K/UL (ref 0–0.12)
BILIRUB SERPL-MCNC: 0.2 MG/DL (ref 0.1–1.5)
BUN SERPL-MCNC: 8 MG/DL (ref 8–22)
CALCIUM SERPL-MCNC: 8.7 MG/DL (ref 8.4–10.2)
CHLORIDE SERPL-SCNC: 100 MMOL/L (ref 96–112)
CO2 SERPL-SCNC: 28 MMOL/L (ref 20–33)
CREAT SERPL-MCNC: 0.73 MG/DL (ref 0.5–1.4)
EKG IMPRESSION: NORMAL
EOSINOPHIL # BLD AUTO: 0.22 K/UL (ref 0–0.51)
EOSINOPHIL NFR BLD: 2.7 % (ref 0–6.9)
ERYTHROCYTE [DISTWIDTH] IN BLOOD BY AUTOMATED COUNT: 45.3 FL (ref 35.9–50)
GLOBULIN SER CALC-MCNC: 2.9 G/DL (ref 1.9–3.5)
GLUCOSE SERPL-MCNC: 89 MG/DL (ref 65–99)
HCT VFR BLD AUTO: 39.2 % (ref 37–47)
HGB BLD-MCNC: 13 G/DL (ref 12–16)
IMM GRANULOCYTES # BLD AUTO: 0.03 K/UL (ref 0–0.11)
IMM GRANULOCYTES NFR BLD AUTO: 0.4 % (ref 0–0.9)
LIPASE SERPL-CCNC: 16 U/L (ref 7–58)
LYMPHOCYTES # BLD AUTO: 3.18 K/UL (ref 1–4.8)
LYMPHOCYTES NFR BLD: 38.4 % (ref 22–41)
MCH RBC QN AUTO: 30.6 PG (ref 27–33)
MCHC RBC AUTO-ENTMCNC: 33.2 G/DL (ref 33.6–35)
MCV RBC AUTO: 92.2 FL (ref 81.4–97.8)
MONOCYTES # BLD AUTO: 0.65 K/UL (ref 0–0.85)
MONOCYTES NFR BLD AUTO: 7.8 % (ref 0–13.4)
NEUTROPHILS # BLD AUTO: 4.15 K/UL (ref 2–7.15)
NEUTROPHILS NFR BLD: 50 % (ref 44–72)
NRBC # BLD AUTO: 0 K/UL
NRBC BLD-RTO: 0 /100 WBC
PLATELET # BLD AUTO: 215 K/UL (ref 164–446)
PMV BLD AUTO: 9.2 FL (ref 9–12.9)
POTASSIUM SERPL-SCNC: 3.7 MMOL/L (ref 3.6–5.5)
PROT SERPL-MCNC: 6.8 G/DL (ref 6–8.2)
RBC # BLD AUTO: 4.25 M/UL (ref 4.2–5.4)
SODIUM SERPL-SCNC: 136 MMOL/L (ref 135–145)
WBC # BLD AUTO: 8.3 K/UL (ref 4.8–10.8)

## 2021-07-02 PROCEDURE — A9270 NON-COVERED ITEM OR SERVICE: HCPCS | Performed by: EMERGENCY MEDICINE

## 2021-07-02 PROCEDURE — 700102 HCHG RX REV CODE 250 W/ 637 OVERRIDE(OP): Performed by: EMERGENCY MEDICINE

## 2021-07-02 PROCEDURE — 80053 COMPREHEN METABOLIC PANEL: CPT

## 2021-07-02 PROCEDURE — 99283 EMERGENCY DEPT VISIT LOW MDM: CPT

## 2021-07-02 PROCEDURE — 36415 COLL VENOUS BLD VENIPUNCTURE: CPT

## 2021-07-02 PROCEDURE — 83690 ASSAY OF LIPASE: CPT

## 2021-07-02 PROCEDURE — 93005 ELECTROCARDIOGRAM TRACING: CPT | Performed by: EMERGENCY MEDICINE

## 2021-07-02 PROCEDURE — 85025 COMPLETE CBC W/AUTO DIFF WBC: CPT

## 2021-07-02 RX ORDER — OMEPRAZOLE 40 MG/1
40 CAPSULE, DELAYED RELEASE ORAL DAILY
Qty: 15 CAPSULE | Refills: 0 | Status: SHIPPED | OUTPATIENT
Start: 2021-07-02 | End: 2024-02-15

## 2021-07-02 RX ADMIN — LIDOCAINE HYDROCHLORIDE 30 ML: 20 SOLUTION OROPHARYNGEAL at 19:51

## 2021-07-02 ASSESSMENT — PAIN DESCRIPTION - PAIN TYPE
TYPE: ACUTE PAIN

## 2021-07-02 ASSESSMENT — FIBROSIS 4 INDEX: FIB4 SCORE: 1.13

## 2021-07-03 NOTE — ED NOTES
Discharge instructions reviewed with patient. AVS signed by patient. PIV removed. Prescription electronically sent to pharmacy of choice. Patient understands need for follow-up appointment with healthcare team and to return to ED for worsening symptoms. All questions answered at this time. Patient ambulated to exit with belongings & visitor. Patient in stable condition with no signs of distress. Patient agreeable to discharge instructions.

## 2021-07-03 NOTE — DISCHARGE INSTRUCTIONS
Your tests here were all very reassuring.  There is no sign of a dangerous cause of your symptoms, which are most likely a result of acid reflux or similar causes.  Use the medication that we have prescribed for the next couple of weeks.  We have placed a referral in our computer to gastroenterology for further evaluation, and to consider an endoscopy, which is a camera study of your esophagus and stomach.  Use the contact information listed here to call to arrange that follow-up.  Please also arrange a follow-up visit with your primary care doctor.

## 2021-07-03 NOTE — ED PROVIDER NOTES
"ED Provider Note    Scribed for Robin Yap M.D. by Robin Yap M.D.. 7/2/2021,  7:14 PM.    CHIEF COMPLAINT  Chief Complaint   Patient presents with   • Epigastric Pain       HPI  Lakesha Fagan is a 45 y.o. female who presents to the Emergency Department complaining of 2 years of epigastric pain, worse for the past 6 months.  She is not on any GI medications, and has discussed the symptoms with her doctor, but not recently, and has not been referred to gastroenterology.  She reports that pain starts in her throat and seems to go to her back and epigastric area.  She says that it has been worse for the past 6 months, and has been having this discomfort every day for the past month.  She says normally it gets better if she drinks some water and lies down, but that has not been helping recently, so she presented here.  She appears well, has normal vital signs, and denies fevers or chills, chest pain, cough, vomiting, constipation or diarrhea.     x2 years. Worse x 6 months. No meds. No GI.    Pt with pain that starts in her throat and goes to back and epigastric area. Pt states it's been going on for \"a while\" but has been having it every day for a month \"but never this bad.\" Normally she has water and then lays down and it goes away. Now that does not help.     REVIEW OF SYSTEMS    See HPI for further details. All other systems are negative.     PAST MEDICAL HISTORY   has a past medical history of Hypertension, Other specified disorder of intestines, and Pain.    SOCIAL HISTORY  Social History     Tobacco Use   • Smoking status: Never Smoker   • Smokeless tobacco: Never Used   Vaping Use   • Vaping Use: Never used   Substance and Sexual Activity   • Alcohol use: No   • Drug use: No   • Sexual activity: Not on file     Social History     Substance and Sexual Activity   Drug Use No       SURGICAL HISTORY   has a past surgical history that includes cholecystectomy; lumbar fusion posterior " "(3/13/2013); lumbar laminectomy diskectomy (3/13/2013); and lumbar decompression (3/13/2013).    CURRENT MEDICATIONS  Home Medications     Reviewed by Maria Isabel Patrick R.N. (Registered Nurse) on 07/02/21 at 1852  Med List Status: Partial   Medication Last Dose Status   busPIRone (BUSPAR) 15 MG tablet  Active   hydrOXYzine HCl (ATARAX) 50 MG Tab  Active   METHADONE HCL PO  Active   ondansetron (ZOFRAN ODT) 4 MG TABLET DISPERSIBLE  Active   raNITidine (ZANTAC) 150 MG Tab  Active                ALLERGIES  No Known Allergies    PHYSICAL EXAM  VITAL SIGNS: BP (!) 99/72   Pulse 75   Temp 36.7 °C (98.1 °F) (Temporal)   Resp 15   Ht 1.6 m (5' 3\")   Wt 92.1 kg (203 lb 0.7 oz)   SpO2 96%   BMI 35.97 kg/m²   Pulse ox interpretation: I interpret this pulse ox as normal.  Constitutional: Alert in no apparent distress.  HENT: No signs of trauma, Bilateral external ears normal, Nose normal.   Eyes: Conjunctiva normal, Non-icteric.   Neck: Normal range of motion, Supple, No stridor.   Lymphatic: No lymphadenopathy noted.   Cardiovascular: Regular rate and rhythm, no murmurs.   Thorax & Lungs: Normal breath sounds, No respiratory distress, No wheezing, No chest tenderness.   Abdomen: Bowel sounds normal, Soft, No tenderness, No masses, No pulsatile masses. No peritoneal signs.  Skin: Warm, Dry, No erythema, No rash.   Extremities: Intact distal pulses, No edema, No cyanosis.  Musculoskeletal: Good range of motion in all major joints. No or major deformities noted.   Neurologic: Alert , Normal motor function, Normal sensory function, No focal deficits noted.   Psychiatric: Affect normal, Judgment normal, Mood normal.     DIAGNOSTIC STUDIES / PROCEDURES    EKG  Interpreted by me    Results for orders placed or performed during the hospital encounter of 07/02/21   EKG   Result Value Ref Range    Report       [facility] Emergency Dept.    Test Date:  2021-07-02  Pt Name:    RIKA HANEY        " Department: [facility]  MRN:        [id number]                      Room:  Gender:     Female                       Technician: MARIUM  :        1975                   Requested By:ER TRIAGE PROTOCOL  Order #:    [id number]                    Reading MD:    Measurements  Intervals                                Axis  Rate:       73                           P:          23  CT:         165                          QRS:        15  QRSD:       98                           T:          33  QT:         391  QTc:        431    Interpretive Statements  Sinus rhythm  Compared to ECG 2019 17:16:35  T-wave abnormality no longer present           LABS  Labs Reviewed   CBC WITH DIFFERENTIAL - Abnormal; Notable for the following components:       Result Value    MCHC 33.2 (*)     All other components within normal limits   COMP METABOLIC PANEL - Abnormal; Notable for the following components:    Alkaline Phosphatase 110 (*)     All other components within normal limits   LIPASE   ESTIMATED GFR     All labs reviewed by me.    RADIOLOGY  No orders to display     The radiologist's interpretation of all radiological studies have been reviewed by me.    COURSE & MEDICAL DECISION MAKING  Nursing notes, VS, PMSFHx reviewed in chart.     7:14 PM Patient seen and examined at bedside.  She is currently asymptomatic, and has a recent exacerbation of chronic epigastric pain, duration of at least 2 years.  There are no obvious exacerbating relieving factors.  She has unremarkable vital signs and a reassuring physical exam.  Differential diagnosis includes but is not limited to GERD, gastritis, gastric reflux, which seems most likely, given the prolonged duration. Ordered for screening laboratory tests to evaluate.  I see no reason to suspect surgical pathology, obstruction, or vascular catastrophe, given the duration of her symptoms without significant effects, and her benign exam.    This patient's evaluation was very reassuring.  She has a  mild, nonspecific alk phos elevation, but has had this in the past, and has no tenderness on exam, including in the right upper quadrant.  I think her symptoms are most likely related to GERD/gastritis, and she will be treated with a course of antacid medication and referred to gastroenterology.  There is good reason to hope that this could be effective, since she is not currently on any GI medications including antacids.     The patient will return for new or worsening symptoms and is stable at the time of discharge.    The patient is referred to a primary physician for blood pressure management, diabetic screening, and for all other preventative health concerns.    DISPOSITION:  Patient will be discharged home in stable condition.    FOLLOW UP:  Marco Montoya M.D.  [address] [facility] [address] [phone]    Schedule an appointment as soon as possible for a visit       Marco Montoya M.D.  [address] [id number] [phone]    Schedule an appointment as soon as possible for a visit       Silva Ellis, A.P.N.  [address]  [facility] [address]  [phone]            OUTPATIENT MEDICATIONS:  Discharge Medication List as of 7/2/2021  8:19 PM      START taking these medications    Details   omeprazole (PRILOSEC) 40 MG delayed-release capsule Take 1 capsule by mouth every day., Disp-15 capsule, R-0, Normal               FINAL IMPRESSION  1. Epigastric abdominal pain

## 2021-07-03 NOTE — ED TRIAGE NOTES
"Pt with pain that starts in her throat and goes to back and epigastric area. Pt states it's been going on for \"a while\" but has been having it every day for a month \"but never this bad.\" Normally she has water and then lays down and it goes away. Now that does not help.     Pt alert, oriented, ambulatory.   "

## 2022-05-10 ENCOUNTER — APPOINTMENT (OUTPATIENT)
Dept: RADIOLOGY | Facility: MEDICAL CENTER | Age: 47
DRG: 872 | End: 2022-05-10
Attending: EMERGENCY MEDICINE
Payer: COMMERCIAL

## 2022-05-10 ENCOUNTER — HOSPITAL ENCOUNTER (INPATIENT)
Facility: MEDICAL CENTER | Age: 47
LOS: 2 days | DRG: 872 | End: 2022-05-13
Attending: EMERGENCY MEDICINE | Admitting: INTERNAL MEDICINE
Payer: COMMERCIAL

## 2022-05-10 DIAGNOSIS — R68.83 CHILLS: ICD-10-CM

## 2022-05-10 DIAGNOSIS — R68.89 RIGORS: ICD-10-CM

## 2022-05-10 DIAGNOSIS — R10.9 FLANK PAIN: ICD-10-CM

## 2022-05-10 DIAGNOSIS — N30.00 ACUTE CYSTITIS WITHOUT HEMATURIA: ICD-10-CM

## 2022-05-10 DIAGNOSIS — A41.9 SEPSIS, DUE TO UNSPECIFIED ORGANISM, UNSPECIFIED WHETHER ACUTE ORGAN DYSFUNCTION PRESENT (HCC): ICD-10-CM

## 2022-05-10 LAB
ALBUMIN SERPL BCP-MCNC: 4 G/DL (ref 3.2–4.9)
ALBUMIN/GLOB SERPL: 1.2 G/DL
ALP SERPL-CCNC: 93 U/L (ref 30–99)
ALT SERPL-CCNC: 12 U/L (ref 2–50)
ANION GAP SERPL CALC-SCNC: 12 MMOL/L (ref 7–16)
AST SERPL-CCNC: 18 U/L (ref 12–45)
BASOPHILS # BLD AUTO: 0.3 % (ref 0–1.8)
BASOPHILS # BLD: 0.05 K/UL (ref 0–0.12)
BILIRUB SERPL-MCNC: 0.6 MG/DL (ref 0.1–1.5)
BUN SERPL-MCNC: 11 MG/DL (ref 8–22)
CALCIUM SERPL-MCNC: 8.7 MG/DL (ref 8.4–10.2)
CHLORIDE SERPL-SCNC: 97 MMOL/L (ref 96–112)
CO2 SERPL-SCNC: 24 MMOL/L (ref 20–33)
CREAT SERPL-MCNC: 1.02 MG/DL (ref 0.5–1.4)
CRP SERPL HS-MCNC: 7.44 MG/DL (ref 0–0.75)
EOSINOPHIL # BLD AUTO: 0.01 K/UL (ref 0–0.51)
EOSINOPHIL NFR BLD: 0.1 % (ref 0–6.9)
ERYTHROCYTE [DISTWIDTH] IN BLOOD BY AUTOMATED COUNT: 42.5 FL (ref 35.9–50)
FLUAV RNA SPEC QL NAA+PROBE: NEGATIVE
FLUBV RNA SPEC QL NAA+PROBE: NEGATIVE
GFR SERPLBLD CREATININE-BSD FMLA CKD-EPI: 69 ML/MIN/1.73 M 2
GLOBULIN SER CALC-MCNC: 3.4 G/DL (ref 1.9–3.5)
GLUCOSE SERPL-MCNC: 112 MG/DL (ref 65–99)
HCT VFR BLD AUTO: 38.1 % (ref 37–47)
HGB BLD-MCNC: 13 G/DL (ref 12–16)
IMM GRANULOCYTES # BLD AUTO: 0.07 K/UL (ref 0–0.11)
IMM GRANULOCYTES NFR BLD AUTO: 0.5 % (ref 0–0.9)
LACTATE BLD-SCNC: 1.2 MMOL/L (ref 0.5–2)
LYMPHOCYTES # BLD AUTO: 1.72 K/UL (ref 1–4.8)
LYMPHOCYTES NFR BLD: 11.7 % (ref 22–41)
MCH RBC QN AUTO: 30.6 PG (ref 27–33)
MCHC RBC AUTO-ENTMCNC: 34.1 G/DL (ref 33.6–35)
MCV RBC AUTO: 89.6 FL (ref 81.4–97.8)
MONOCYTES # BLD AUTO: 0.88 K/UL (ref 0–0.85)
MONOCYTES NFR BLD AUTO: 6 % (ref 0–13.4)
NEUTROPHILS # BLD AUTO: 12 K/UL (ref 2–7.15)
NEUTROPHILS NFR BLD: 81.4 % (ref 44–72)
NRBC # BLD AUTO: 0 K/UL
NRBC BLD-RTO: 0 /100 WBC
PLATELET # BLD AUTO: 222 K/UL (ref 164–446)
PMV BLD AUTO: 9.1 FL (ref 9–12.9)
POTASSIUM SERPL-SCNC: 3.5 MMOL/L (ref 3.6–5.5)
PROCALCITONIN SERPL-MCNC: 4.48 NG/ML
PROT SERPL-MCNC: 7.4 G/DL (ref 6–8.2)
RBC # BLD AUTO: 4.25 M/UL (ref 4.2–5.4)
RSV RNA SPEC QL NAA+PROBE: NEGATIVE
SARS-COV-2 RNA RESP QL NAA+PROBE: NOTDETECTED
SODIUM SERPL-SCNC: 133 MMOL/L (ref 135–145)
SPECIMEN SOURCE: NORMAL
WBC # BLD AUTO: 14.7 K/UL (ref 4.8–10.8)

## 2022-05-10 PROCEDURE — C9803 HOPD COVID-19 SPEC COLLECT: HCPCS | Performed by: EMERGENCY MEDICINE

## 2022-05-10 PROCEDURE — 83605 ASSAY OF LACTIC ACID: CPT

## 2022-05-10 PROCEDURE — 700102 HCHG RX REV CODE 250 W/ 637 OVERRIDE(OP): Performed by: EMERGENCY MEDICINE

## 2022-05-10 PROCEDURE — 36415 COLL VENOUS BLD VENIPUNCTURE: CPT

## 2022-05-10 PROCEDURE — 87040 BLOOD CULTURE FOR BACTERIA: CPT | Mod: 91

## 2022-05-10 PROCEDURE — 700105 HCHG RX REV CODE 258: Performed by: EMERGENCY MEDICINE

## 2022-05-10 PROCEDURE — 84145 PROCALCITONIN (PCT): CPT

## 2022-05-10 PROCEDURE — A9270 NON-COVERED ITEM OR SERVICE: HCPCS | Performed by: EMERGENCY MEDICINE

## 2022-05-10 PROCEDURE — 85025 COMPLETE CBC W/AUTO DIFF WBC: CPT

## 2022-05-10 PROCEDURE — 86140 C-REACTIVE PROTEIN: CPT

## 2022-05-10 PROCEDURE — 80053 COMPREHEN METABOLIC PANEL: CPT

## 2022-05-10 PROCEDURE — 99285 EMERGENCY DEPT VISIT HI MDM: CPT

## 2022-05-10 PROCEDURE — 0241U HCHG SARS-COV-2 COVID-19 NFCT DS RESP RNA 4 TRGT MIC: CPT

## 2022-05-10 PROCEDURE — 71045 X-RAY EXAM CHEST 1 VIEW: CPT

## 2022-05-10 RX ORDER — ACETAMINOPHEN 500 MG
1000 TABLET ORAL ONCE
Status: COMPLETED | OUTPATIENT
Start: 2022-05-10 | End: 2022-05-10

## 2022-05-10 RX ORDER — SODIUM CHLORIDE, SODIUM LACTATE, POTASSIUM CHLORIDE, AND CALCIUM CHLORIDE .6; .31; .03; .02 G/100ML; G/100ML; G/100ML; G/100ML
30 INJECTION, SOLUTION INTRAVENOUS ONCE
Status: COMPLETED | OUTPATIENT
Start: 2022-05-10 | End: 2022-05-11

## 2022-05-10 RX ADMIN — SODIUM CHLORIDE, POTASSIUM CHLORIDE, SODIUM LACTATE AND CALCIUM CHLORIDE 1572 ML: 600; 310; 30; 20 INJECTION, SOLUTION INTRAVENOUS at 22:45

## 2022-05-10 RX ADMIN — ACETAMINOPHEN 1000 MG: 500 TABLET, FILM COATED ORAL at 22:45

## 2022-05-10 ASSESSMENT — FIBROSIS 4 INDEX: FIB4 SCORE: 0.95

## 2022-05-11 ENCOUNTER — APPOINTMENT (OUTPATIENT)
Dept: RADIOLOGY | Facility: MEDICAL CENTER | Age: 47
DRG: 872 | End: 2022-05-11
Attending: STUDENT IN AN ORGANIZED HEALTH CARE EDUCATION/TRAINING PROGRAM
Payer: COMMERCIAL

## 2022-05-11 PROBLEM — E87.6 HYPOKALEMIA: Status: ACTIVE | Noted: 2022-05-11

## 2022-05-11 PROBLEM — N39.0 UTI (URINARY TRACT INFECTION): Status: ACTIVE | Noted: 2022-05-11

## 2022-05-11 PROBLEM — E87.1 HYPONATREMIA: Status: ACTIVE | Noted: 2022-05-11

## 2022-05-11 PROBLEM — R73.9 HYPERGLYCEMIA: Status: ACTIVE | Noted: 2022-05-11

## 2022-05-11 PROBLEM — F41.9 ANXIETY: Status: ACTIVE | Noted: 2022-05-11

## 2022-05-11 PROBLEM — G89.29 PAIN, CHRONIC: Status: ACTIVE | Noted: 2022-05-11

## 2022-05-11 PROBLEM — A41.9 SEPSIS (HCC): Status: ACTIVE | Noted: 2022-05-11

## 2022-05-11 LAB
ANION GAP SERPL CALC-SCNC: 10 MMOL/L (ref 7–16)
APPEARANCE UR: CLEAR
BACTERIA #/AREA URNS HPF: ABNORMAL /HPF
BASOPHILS # BLD AUTO: 0.4 % (ref 0–1.8)
BASOPHILS # BLD: 0.04 K/UL (ref 0–0.12)
BILIRUB UR QL STRIP.AUTO: NEGATIVE
BUN SERPL-MCNC: 9 MG/DL (ref 8–22)
CALCIUM SERPL-MCNC: 8.9 MG/DL (ref 8.4–10.2)
CHLORIDE SERPL-SCNC: 103 MMOL/L (ref 96–112)
CO2 SERPL-SCNC: 26 MMOL/L (ref 20–33)
COLOR UR: YELLOW
CREAT SERPL-MCNC: 0.84 MG/DL (ref 0.5–1.4)
EOSINOPHIL # BLD AUTO: 0.04 K/UL (ref 0–0.51)
EOSINOPHIL NFR BLD: 0.4 % (ref 0–6.9)
EPI CELLS #/AREA URNS HPF: ABNORMAL /HPF
ERYTHROCYTE [DISTWIDTH] IN BLOOD BY AUTOMATED COUNT: 44.7 FL (ref 35.9–50)
GFR SERPLBLD CREATININE-BSD FMLA CKD-EPI: 87 ML/MIN/1.73 M 2
GLUCOSE SERPL-MCNC: 102 MG/DL (ref 65–99)
GLUCOSE UR STRIP.AUTO-MCNC: NEGATIVE MG/DL
HCT VFR BLD AUTO: 38.1 % (ref 37–47)
HGB BLD-MCNC: 12.8 G/DL (ref 12–16)
HYALINE CASTS #/AREA URNS LPF: ABNORMAL /LPF
IMM GRANULOCYTES # BLD AUTO: 0.05 K/UL (ref 0–0.11)
IMM GRANULOCYTES NFR BLD AUTO: 0.4 % (ref 0–0.9)
INR PPP: 1.16 (ref 0.87–1.13)
KETONES UR STRIP.AUTO-MCNC: NEGATIVE MG/DL
LACTATE BLD-SCNC: 1.1 MMOL/L (ref 0.5–2)
LACTATE BLD-SCNC: 1.4 MMOL/L (ref 0.5–2)
LEUKOCYTE ESTERASE UR QL STRIP.AUTO: ABNORMAL
LYMPHOCYTES # BLD AUTO: 2.48 K/UL (ref 1–4.8)
LYMPHOCYTES NFR BLD: 22.1 % (ref 22–41)
MCH RBC QN AUTO: 30.9 PG (ref 27–33)
MCHC RBC AUTO-ENTMCNC: 33.6 G/DL (ref 33.6–35)
MCV RBC AUTO: 92 FL (ref 81.4–97.8)
MICRO URNS: ABNORMAL
MONOCYTES # BLD AUTO: 0.93 K/UL (ref 0–0.85)
MONOCYTES NFR BLD AUTO: 8.3 % (ref 0–13.4)
NEUTROPHILS # BLD AUTO: 7.7 K/UL (ref 2–7.15)
NEUTROPHILS NFR BLD: 68.4 % (ref 44–72)
NITRITE UR QL STRIP.AUTO: POSITIVE
NRBC # BLD AUTO: 0 K/UL
NRBC BLD-RTO: 0 /100 WBC
PH UR STRIP.AUTO: 6 [PH] (ref 5–8)
PLATELET # BLD AUTO: 219 K/UL (ref 164–446)
PMV BLD AUTO: 9.3 FL (ref 9–12.9)
POTASSIUM SERPL-SCNC: 3.8 MMOL/L (ref 3.6–5.5)
PROT UR QL STRIP: NEGATIVE MG/DL
PROTHROMBIN TIME: 13.9 SEC (ref 12–14.6)
RBC # BLD AUTO: 4.14 M/UL (ref 4.2–5.4)
RBC # URNS HPF: ABNORMAL /HPF
RBC UR QL AUTO: ABNORMAL
SODIUM SERPL-SCNC: 139 MMOL/L (ref 135–145)
SP GR UR STRIP.AUTO: <=1.005
WBC # BLD AUTO: 11.2 K/UL (ref 4.8–10.8)
WBC #/AREA URNS HPF: ABNORMAL /HPF

## 2022-05-11 PROCEDURE — 700105 HCHG RX REV CODE 258: Performed by: STUDENT IN AN ORGANIZED HEALTH CARE EDUCATION/TRAINING PROGRAM

## 2022-05-11 PROCEDURE — 36415 COLL VENOUS BLD VENIPUNCTURE: CPT

## 2022-05-11 PROCEDURE — 85610 PROTHROMBIN TIME: CPT

## 2022-05-11 PROCEDURE — 83605 ASSAY OF LACTIC ACID: CPT

## 2022-05-11 PROCEDURE — 700111 HCHG RX REV CODE 636 W/ 250 OVERRIDE (IP): Performed by: INTERNAL MEDICINE

## 2022-05-11 PROCEDURE — 700102 HCHG RX REV CODE 250 W/ 637 OVERRIDE(OP): Performed by: INTERNAL MEDICINE

## 2022-05-11 PROCEDURE — 85025 COMPLETE CBC W/AUTO DIFF WBC: CPT

## 2022-05-11 PROCEDURE — 99223 1ST HOSP IP/OBS HIGH 75: CPT | Performed by: INTERNAL MEDICINE

## 2022-05-11 PROCEDURE — 74018 RADEX ABDOMEN 1 VIEW: CPT

## 2022-05-11 PROCEDURE — 700111 HCHG RX REV CODE 636 W/ 250 OVERRIDE (IP)

## 2022-05-11 PROCEDURE — 80048 BASIC METABOLIC PNL TOTAL CA: CPT

## 2022-05-11 PROCEDURE — 81001 URINALYSIS AUTO W/SCOPE: CPT

## 2022-05-11 PROCEDURE — 700101 HCHG RX REV CODE 250: Performed by: INTERNAL MEDICINE

## 2022-05-11 PROCEDURE — 96374 THER/PROPH/DIAG INJ IV PUSH: CPT

## 2022-05-11 PROCEDURE — A9270 NON-COVERED ITEM OR SERVICE: HCPCS | Performed by: INTERNAL MEDICINE

## 2022-05-11 PROCEDURE — 87086 URINE CULTURE/COLONY COUNT: CPT

## 2022-05-11 PROCEDURE — 770006 HCHG ROOM/CARE - MED/SURG/GYN SEMI*

## 2022-05-11 PROCEDURE — 94760 N-INVAS EAR/PLS OXIMETRY 1: CPT

## 2022-05-11 RX ORDER — PROMETHAZINE HYDROCHLORIDE 25 MG/1
12.5-25 TABLET ORAL EVERY 4 HOURS PRN
Status: DISCONTINUED | OUTPATIENT
Start: 2022-05-11 | End: 2022-05-13 | Stop reason: HOSPADM

## 2022-05-11 RX ORDER — METHADONE HYDROCHLORIDE 10 MG/1
85 TABLET ORAL DAILY
Status: DISCONTINUED | OUTPATIENT
Start: 2022-05-11 | End: 2022-05-13 | Stop reason: HOSPADM

## 2022-05-11 RX ORDER — AMOXICILLIN 250 MG
2 CAPSULE ORAL 2 TIMES DAILY
Status: DISCONTINUED | OUTPATIENT
Start: 2022-05-11 | End: 2022-05-13 | Stop reason: HOSPADM

## 2022-05-11 RX ORDER — PROCHLORPERAZINE EDISYLATE 5 MG/ML
5-10 INJECTION INTRAMUSCULAR; INTRAVENOUS EVERY 4 HOURS PRN
Status: DISCONTINUED | OUTPATIENT
Start: 2022-05-11 | End: 2022-05-13 | Stop reason: HOSPADM

## 2022-05-11 RX ORDER — SODIUM CHLORIDE, SODIUM LACTATE, POTASSIUM CHLORIDE, AND CALCIUM CHLORIDE .6; .31; .03; .02 G/100ML; G/100ML; G/100ML; G/100ML
1000 INJECTION, SOLUTION INTRAVENOUS
Status: DISCONTINUED | OUTPATIENT
Start: 2022-05-11 | End: 2022-05-13 | Stop reason: HOSPADM

## 2022-05-11 RX ORDER — KETOROLAC TROMETHAMINE 30 MG/ML
30 INJECTION, SOLUTION INTRAMUSCULAR; INTRAVENOUS
Status: COMPLETED | OUTPATIENT
Start: 2022-05-11 | End: 2022-05-11

## 2022-05-11 RX ORDER — ACETAMINOPHEN 325 MG/1
650 TABLET ORAL EVERY 6 HOURS PRN
Status: DISCONTINUED | OUTPATIENT
Start: 2022-05-11 | End: 2022-05-13 | Stop reason: HOSPADM

## 2022-05-11 RX ORDER — CEFTRIAXONE 2 G/1
2 INJECTION, POWDER, FOR SOLUTION INTRAMUSCULAR; INTRAVENOUS ONCE
Status: COMPLETED | OUTPATIENT
Start: 2022-05-11 | End: 2022-05-11

## 2022-05-11 RX ORDER — SODIUM CHLORIDE AND POTASSIUM CHLORIDE 150; 900 MG/100ML; MG/100ML
INJECTION, SOLUTION INTRAVENOUS CONTINUOUS
Status: DISCONTINUED | OUTPATIENT
Start: 2022-05-11 | End: 2022-05-11

## 2022-05-11 RX ORDER — SODIUM CHLORIDE 9 MG/ML
INJECTION, SOLUTION INTRAVENOUS CONTINUOUS
Status: DISCONTINUED | OUTPATIENT
Start: 2022-05-11 | End: 2022-05-13

## 2022-05-11 RX ORDER — POLYETHYLENE GLYCOL 3350 17 G/17G
1 POWDER, FOR SOLUTION ORAL
Status: DISCONTINUED | OUTPATIENT
Start: 2022-05-11 | End: 2022-05-13 | Stop reason: HOSPADM

## 2022-05-11 RX ORDER — HYDROXYZINE HYDROCHLORIDE 25 MG/1
50 TABLET, FILM COATED ORAL 3 TIMES DAILY PRN
Status: DISCONTINUED | OUTPATIENT
Start: 2022-05-11 | End: 2022-05-13 | Stop reason: HOSPADM

## 2022-05-11 RX ORDER — ONDANSETRON 4 MG/1
4 TABLET, ORALLY DISINTEGRATING ORAL EVERY 4 HOURS PRN
Status: DISCONTINUED | OUTPATIENT
Start: 2022-05-11 | End: 2022-05-13 | Stop reason: HOSPADM

## 2022-05-11 RX ORDER — KETOROLAC TROMETHAMINE 30 MG/ML
15 INJECTION, SOLUTION INTRAMUSCULAR; INTRAVENOUS ONCE
Status: DISCONTINUED | OUTPATIENT
Start: 2022-05-11 | End: 2022-05-11

## 2022-05-11 RX ORDER — CEFTRIAXONE 2 G/1
INJECTION, POWDER, FOR SOLUTION INTRAMUSCULAR; INTRAVENOUS
Status: COMPLETED
Start: 2022-05-11 | End: 2022-05-11

## 2022-05-11 RX ORDER — PROMETHAZINE HYDROCHLORIDE 25 MG/1
12.5-25 SUPPOSITORY RECTAL EVERY 4 HOURS PRN
Status: DISCONTINUED | OUTPATIENT
Start: 2022-05-11 | End: 2022-05-13 | Stop reason: HOSPADM

## 2022-05-11 RX ORDER — SODIUM CHLORIDE, SODIUM LACTATE, POTASSIUM CHLORIDE, AND CALCIUM CHLORIDE .6; .31; .03; .02 G/100ML; G/100ML; G/100ML; G/100ML
30 INJECTION, SOLUTION INTRAVENOUS
Status: DISCONTINUED | OUTPATIENT
Start: 2022-05-11 | End: 2022-05-11

## 2022-05-11 RX ORDER — ONDANSETRON 2 MG/ML
4 INJECTION INTRAMUSCULAR; INTRAVENOUS EVERY 4 HOURS PRN
Status: DISCONTINUED | OUTPATIENT
Start: 2022-05-11 | End: 2022-05-13 | Stop reason: HOSPADM

## 2022-05-11 RX ORDER — BISACODYL 10 MG
10 SUPPOSITORY, RECTAL RECTAL
Status: DISCONTINUED | OUTPATIENT
Start: 2022-05-11 | End: 2022-05-13 | Stop reason: HOSPADM

## 2022-05-11 RX ORDER — BUSPIRONE HYDROCHLORIDE 5 MG/1
15 TABLET ORAL 2 TIMES DAILY
Status: DISCONTINUED | OUTPATIENT
Start: 2022-05-11 | End: 2022-05-13 | Stop reason: HOSPADM

## 2022-05-11 RX ADMIN — SENNOSIDES AND DOCUSATE SODIUM 2 TABLET: 50; 8.6 TABLET ORAL at 09:18

## 2022-05-11 RX ADMIN — RIVAROXABAN 10 MG: 10 TABLET, FILM COATED ORAL at 09:18

## 2022-05-11 RX ADMIN — SENNOSIDES AND DOCUSATE SODIUM 2 TABLET: 50; 8.6 TABLET ORAL at 17:12

## 2022-05-11 RX ADMIN — BUSPIRONE HYDROCHLORIDE 15 MG: 5 TABLET ORAL at 16:42

## 2022-05-11 RX ADMIN — CEFTRIAXONE SODIUM 2 G: 2 INJECTION, POWDER, FOR SOLUTION INTRAMUSCULAR; INTRAVENOUS at 01:21

## 2022-05-11 RX ADMIN — SODIUM CHLORIDE: 9 INJECTION, SOLUTION INTRAVENOUS at 15:34

## 2022-05-11 RX ADMIN — POTASSIUM CHLORIDE AND SODIUM CHLORIDE: 900; 150 INJECTION, SOLUTION INTRAVENOUS at 09:20

## 2022-05-11 RX ADMIN — HYDROXYZINE HYDROCHLORIDE 50 MG: 25 TABLET, FILM COATED ORAL at 16:42

## 2022-05-11 RX ADMIN — KETOROLAC TROMETHAMINE 30 MG: 30 INJECTION, SOLUTION INTRAMUSCULAR at 22:56

## 2022-05-11 RX ADMIN — ACETAMINOPHEN 650 MG: 325 TABLET, FILM COATED ORAL at 15:10

## 2022-05-11 RX ADMIN — METHADONE HYDROCHLORIDE 85 MG: 10 TABLET ORAL at 09:18

## 2022-05-11 RX ADMIN — CEFTRIAXONE 2 G: 2 INJECTION, POWDER, FOR SOLUTION INTRAMUSCULAR; INTRAVENOUS at 01:21

## 2022-05-11 RX ADMIN — ACETAMINOPHEN 650 MG: 325 TABLET, FILM COATED ORAL at 23:08

## 2022-05-11 ASSESSMENT — ENCOUNTER SYMPTOMS
DEPRESSION: 0
FEVER: 1
LOSS OF CONSCIOUSNESS: 0
NAUSEA: 1
WEAKNESS: 0
COUGH: 0
HEADACHES: 0
TINGLING: 0
STRIDOR: 0
SHORTNESS OF BREATH: 0
PALPITATIONS: 0
MYALGIAS: 0
SPUTUM PRODUCTION: 0
ABDOMINAL PAIN: 0
VOMITING: 0
DIZZINESS: 0
FALLS: 0
CHILLS: 1
CONSTIPATION: 0
FLANK PAIN: 0
DIARRHEA: 0

## 2022-05-11 ASSESSMENT — COGNITIVE AND FUNCTIONAL STATUS - GENERAL
DAILY ACTIVITIY SCORE: 24
MOBILITY SCORE: 24
SUGGESTED CMS G CODE MODIFIER DAILY ACTIVITY: CH
SUGGESTED CMS G CODE MODIFIER MOBILITY: CH

## 2022-05-11 ASSESSMENT — LIFESTYLE VARIABLES
CONSUMPTION TOTAL: NEGATIVE
HOW MANY TIMES IN THE PAST YEAR HAVE YOU HAD 5 OR MORE DRINKS IN A DAY: 0
EVER HAD A DRINK FIRST THING IN THE MORNING TO STEADY YOUR NERVES TO GET RID OF A HANGOVER: NO
AVERAGE NUMBER OF DAYS PER WEEK YOU HAVE A DRINK CONTAINING ALCOHOL: 0
TOTAL SCORE: 0
HAVE PEOPLE ANNOYED YOU BY CRITICIZING YOUR DRINKING: NO
HAVE YOU EVER FELT YOU SHOULD CUT DOWN ON YOUR DRINKING: NO
EVER FELT BAD OR GUILTY ABOUT YOUR DRINKING: NO
ALCOHOL_USE: NO
ON A TYPICAL DAY WHEN YOU DRINK ALCOHOL HOW MANY DRINKS DO YOU HAVE: 0

## 2022-05-11 ASSESSMENT — PATIENT HEALTH QUESTIONNAIRE - PHQ9
SUM OF ALL RESPONSES TO PHQ9 QUESTIONS 1 AND 2: 0
2. FEELING DOWN, DEPRESSED, IRRITABLE, OR HOPELESS: NOT AT ALL
1. LITTLE INTEREST OR PLEASURE IN DOING THINGS: NOT AT ALL

## 2022-05-11 ASSESSMENT — PAIN DESCRIPTION - PAIN TYPE
TYPE: CHRONIC PAIN
TYPE: ACUTE PAIN
TYPE: CHRONIC PAIN
TYPE: ACUTE PAIN
TYPE: CHRONIC PAIN

## 2022-05-11 ASSESSMENT — FIBROSIS 4 INDEX: FIB4 SCORE: 1.05

## 2022-05-11 NOTE — CARE PLAN
The patient is Watcher - Medium risk of patient condition declining or worsening    Shift Goals  Patient Goals: manage pain, antibiotics  Family Goals: manage pain    Progress made toward(s) clinical / shift goals:  Pt resting comfortably in bed this shift. Pt received Tylenol for back pain. Pt had fever this afternoon, blood cultures ordered and Tylenol given. Pt on IV fluids and IV abx. Urine culture obtained.    Patient is not progressing towards the following goals:

## 2022-05-11 NOTE — PROGRESS NOTES
Med rec updated and complete, per pt   Allergies reviewed, per pt   [phone] verify METHADONE dose, pt takes (85MG)

## 2022-05-11 NOTE — PROGRESS NOTES
Patient has CONSENTED to participate in the [facility] Remote Patient Monitoring proof of concept program.

## 2022-05-11 NOTE — ASSESSMENT & PLAN NOTE
-This is Sepsis Present on admission  SIRS criteria identified on my evaluation include: Tachycardia, with heart rate greater than 90 BPM and Leukocytosis, with WBC greater than 12,000  Source is urinary tract infection  Sepsis protocol initiated  Fluid resuscitation ordered per protocol  Crystalloid Fluid Administration: Fluid resuscitation ordered per standard protocol - 30 mL/kg per current or ideal body weight  IV antibiotics as appropriate for source of sepsis  Reassessment: I have reassessed the patient's hemodynamic status  - Febrile episodes overnight while the patient is on IV ceftriaxone.  Escalated antibiotic to Zosyn.  KUB nil acute.  Follow final urine C&S.

## 2022-05-11 NOTE — ED NOTES
Patient states she has had a very high fever, does not know how high because she didn't take it.  Did not take tylenol or motrin for the fever.  Also c/o nausea with no vomiting or diarrhea and a headache.  Patient is tachycardic.

## 2022-05-11 NOTE — DISCHARGE PLANNING
PC to pt. She reports being independent with ADLs and IADLs. She said that her family supports her and has no dc concerns.     Care Transition Team Assessment    Information Source  Orientation Level: Oriented X4  Information Given By: Patient  Informant's Name: Lakesha  Who is responsible for making decisions for patient? : Patient    Readmission Evaluation  Is this a readmission?: No    Elopement Risk  Legal Hold: No  Ambulatory or Self Mobile in Wheelchair: No-Not an Elopement Risk  Elopement Risk: Not at Risk for Elopement    Interdisciplinary Discharge Planning  Does Admitting Nurse Feel This Could be a Complex Discharge?: No  Primary Care Physician: ANNIE Ellis  Lives with - Patient's Self Care Capacity: Spouse  Patient or legal guardian wants to designate a caregiver: No  Support Systems: Family Member(s), Spouse / Significant Other  Housing / Facility: [address]  Do You Take your Prescribed Medications Regularly: Yes  Able to Return to Previous ADL's: Yes  Mobility Issues: No  Prior Services: None, Home-Independent  Patient Prefers to be Discharged to:: Home  Assistance Needed: No  Durable Medical Equipment: Not Applicable    Discharge Preparedness  What is your plan after discharge?: Home with help  What are your discharge supports?: Spouse, Other (comment) (has good family support)  Prior Functional Level: Ambulatory, Drives Self, Independent with Activities of Daily Living, Independent with Medication Management  Difficulity with ADLs: None  Difficulity with IADLs: None    Functional Assesment  Prior Functional Level: Ambulatory, Drives Self, Independent with Activities of Daily Living, Independent with Medication Management    Finances  Financial Barriers to Discharge: No  Prescription Coverage: Yes (uses [facility] [employer] @ [address])    Vision / Hearing Impairment  Vision Impairment : No  Hearing Impairment : No         Advance Directive  Advance Directive?: None    Domestic Abuse  Have you  ever been the victim of abuse or violence?: No  Physical Abuse or Sexual Abuse: No  Verbal Abuse or Emotional Abuse: No  Possible Abuse/Neglect Reported to:: Not Applicable         Discharge Risks or Barriers  Discharge risks or barriers?: No  Patient risk factors: Other (comment)    Anticipated Discharge Information  Discharge Disposition: Discharged to home/self care (01) (Simultaneous filing. User may not have seen previous data.)

## 2022-05-11 NOTE — ED NOTES
Assist RN: IV started with blood drawn with the 1st set of blood culture and COVID swab collected, sent to lab pending results.   at bedside to draw the 2nd set of blood cultures and sent to lab pending results.    Patient medicated as ordered.  IV LR infusing as ordered

## 2022-05-11 NOTE — ED TRIAGE NOTES
"Pt comes in w/   c/o fever, body shakings and weakness that started yesterday  Went to work today was was unable to continue there due to \"shaking\" of her body   Feels weak   "

## 2022-05-11 NOTE — H&P
Hospital Medicine History & Physical Note    Date of Service  5/11/2022    Primary Care Physician  KENISHA Hackett    Consultants  None    Specialist Names: None    Code Status  Full Code    Chief Complaint  Chief Complaint   Patient presents with   • Fever     Started last night     • Shaking     Started today while at work    • Headache     Stated today        History of Presenting Illness  Lakesha Fagan is a 46 y.o. female who presented 5/10/2022 with fever and chills.  Patient states her symptoms initially started 2 days ago with mild chills.  She did try to go to work yesterday around 1 PM, began having worse chills so she was sent home.  She states she went home and was very tired so she slept most of the afternoon.  Later she had an even more severe episode of chills with rigors, because of this she presented to the emergency department.  She did test negative for COVID however she was noted to have a urinary tract infection.  She was noted to have an elevated heart rate at 140.  I did discuss the case including labs and imaging with the ER physician.    I discussed the plan of care with patient.    Review of Systems  Review of Systems   Constitutional: Positive for chills, fever and malaise/fatigue.   HENT: Negative for congestion.    Respiratory: Negative for cough, sputum production, shortness of breath and stridor.    Cardiovascular: Negative for chest pain, palpitations and leg swelling.   Gastrointestinal: Positive for nausea. Negative for abdominal pain, constipation, diarrhea and vomiting.   Genitourinary: Positive for urgency. Negative for dysuria and flank pain.   Musculoskeletal: Negative for falls and myalgias.   Neurological: Negative for dizziness, tingling, loss of consciousness, weakness and headaches.   Psychiatric/Behavioral: Negative for depression and suicidal ideas.   All other systems reviewed and are negative.      Past Medical History   has a past medical  history of Hypertension, Other specified disorder of intestines, and Pain.    Surgical History   has a past surgical history that includes cholecystectomy; fusion, spine, lumbar, plif (3/13/2013); lumbar laminectomy diskectomy (3/13/2013); and lumbar decompression (3/13/2013).     Family History  family history is not on file.   Family history reviewed with patient. There is no family history that is pertinent to the chief complaint.     Social History   reports that she has never smoked. She has never used smokeless tobacco. She reports that she does not drink alcohol and does not use drugs.    Allergies  No Known Allergies    Medications  Prior to Admission Medications   Prescriptions Last Dose Informant Patient Reported? Taking?   METHADONE HCL PO  Patient Yes No   Sig: Take 66 mg by mouth every day. [facility]     COULD NOT VERIFY   busPIRone (BUSPAR) 15 MG tablet  Patient Yes No   Sig: Take 15 mg by mouth 2 times a day.   hydrOXYzine HCl (ATARAX) 50 MG Tab  Patient Yes No   Sig: Take 50 mg by mouth 3 times a day as needed for Anxiety.   omeprazole (PRILOSEC) 40 MG delayed-release capsule Not Taking at Unknown time  No No   Sig: Take 1 capsule by mouth every day.   Patient not taking: Reported on 5/10/2022   ondansetron (ZOFRAN ODT) 4 MG TABLET DISPERSIBLE   No No   Sig: Take 1 Tab by mouth every 6 hours as needed.   Patient not taking: Reported on 11/28/2020   raNITidine (ZANTAC) 150 MG Tab   No No   Sig: Take 1 Tab by mouth 2 times a day.   Patient not taking: Reported on 11/28/2020      Facility-Administered Medications: None       Physical Exam  Temp:  [37 °C (98.6 °F)-37.6 °C (99.6 °F)] 37 °C (98.6 °F)  Pulse:  [] 72  Resp:  [12-20] 12  BP: (105-123)/(53-76) 110/76  SpO2:  [90 %-93 %] 93 %  Blood Pressure: 110/76   Temperature: 37 °C (98.6 °F)   Pulse: 72   Respiration: 12   Pulse Oximetry: 93 %       Physical Exam  Vitals and nursing note reviewed.   Constitutional:       General: She is not in  acute distress.     Appearance: She is well-developed. She is not diaphoretic.   HENT:      Head: Normocephalic and atraumatic.      Right Ear: External ear normal.      Left Ear: External ear normal.      Nose: Nose normal. No congestion or rhinorrhea.      Mouth/Throat:      Mouth: Mucous membranes are dry.      Pharynx: No oropharyngeal exudate.   Eyes:      General:         Right eye: No discharge.         Left eye: No discharge.      Extraocular Movements: Extraocular movements intact.   Neck:      Trachea: No tracheal deviation.   Cardiovascular:      Rate and Rhythm: Regular rhythm. Tachycardia present.      Heart sounds: No murmur heard.    No friction rub. No gallop.   Pulmonary:      Effort: Pulmonary effort is normal. No respiratory distress.      Breath sounds: Normal breath sounds. No stridor. No wheezing or rales.   Chest:      Chest wall: No tenderness.   Abdominal:      General: Bowel sounds are normal. There is no distension.      Palpations: Abdomen is soft.      Tenderness: There is no abdominal tenderness.   Musculoskeletal:         General: No tenderness. Normal range of motion.      Cervical back: Normal range of motion and neck supple.      Right lower leg: No edema.      Left lower leg: No edema.   Lymphadenopathy:      Cervical: No cervical adenopathy.   Skin:     General: Skin is warm and dry.      Findings: No erythema or rash.   Neurological:      General: No focal deficit present.      Mental Status: She is alert and oriented to person, place, and time.      Cranial Nerves: No cranial nerve deficit.   Psychiatric:         Mood and Affect: Mood normal.         Behavior: Behavior normal.         Thought Content: Thought content normal.         Judgment: Judgment normal.         Laboratory:  Recent Labs     05/10/22  2238   WBC 14.7*   RBC 4.25   HEMOGLOBIN 13.0   HEMATOCRIT 38.1   MCV 89.6   MCH 30.6   MCHC 34.1   RDW 42.5   PLATELETCT 222   MPV 9.1     Recent Labs     05/10/22  2238    SODIUM 133*   POTASSIUM 3.5*   CHLORIDE 97   CO2 24   GLUCOSE 112*   BUN 11   CREATININE 1.02   CALCIUM 8.7     Recent Labs     05/10/22  2238   ALTSGPT 12   ASTSGOT 18   ALKPHOSPHAT 93   TBILIRUBIN 0.6   GLUCOSE 112*         No results for input(s): NTPROBNP in the last 72 hours.      No results for input(s): TROPONINT in the last 72 hours.    Imaging:  DX-CHEST-PORTABLE (1 VIEW)   Final Result         1. No acute cardiopulmonary abnormalities are identified.          X-Ray:  I have personally reviewed the images and compared with prior images.    Assessment/Plan:  Justification for Admission Status  I anticipate this patient will require at least two midnights for appropriate medical management, necessitating inpatient admission because Urinary tract infection causing sepsis    * Sepsis (HCC)- (present on admission)  Assessment & Plan  -This is Sepsis Present on admission  SIRS criteria identified on my evaluation include: Tachycardia, with heart rate greater than 90 BPM and Leukocytosis, with WBC greater than 12,000  Source is urinary tract infection  Sepsis protocol initiated  Fluid resuscitation ordered per protocol  Crystalloid Fluid Administration: Fluid resuscitation ordered per standard protocol - 30 mL/kg per current or ideal body weight  IV antibiotics as appropriate for source of sepsis  Reassessment: I have reassessed the patient's hemodynamic status  -Start IV Rocephin  -Await culture results, previous urine culture showed pansensitive Proteus  -Trend lactic acid level  -Patient has a risk of worsening, closely monitor her hemodynamics  -Patient does require inpatient hospitalization to adequately treat sepsis causing significant tachycardia, unfortunately, I do not feel she would be able to tolerate oral antibiotic at this point in time, if she was sent home, she runs a high risk of her sepsis worsening, developing shock    UTI (urinary tract infection)- (present on admission)  Assessment & Plan  -  Start IV Rocephin  -Await culture results  -Causing sepsis    Anxiety- (present on admission)  Assessment & Plan  - Chronic, no acute worsening  -Continue home BuSpar and hydroxyzine    Pain, chronic- (present on admission)  Assessment & Plan  - Continue home methadone    Hyperglycemia- (present on admission)  Assessment & Plan  - Mild, no need for coverage    Hypokalemia- (present on admission)  Assessment & Plan  - Place IV potassium and with IV fluids  -Repeat BMP in the morning    Hyponatremia- (present on admission)  Assessment & Plan  - Mild, due to dehydration  -Start IV fluids  -Repeat BMP in the morning      VTE prophylaxis: SCDs/TEDs and Xarelto 10 mg daily as prophylaxis

## 2022-05-11 NOTE — ASSESSMENT & PLAN NOTE
- Febrile episodes overnight while the patient is on IV ceftriaxone.  Escalated antibiotic to Zosyn.  KUB nil acute.  Follow final urine C&S.

## 2022-05-11 NOTE — PROGRESS NOTES
4 Eyes Skin Assessment Completed by LOI Condon and LOI Florez.    Head WDL  Ears WDL  Nose WDL  Mouth WDL  Neck WDL  Breast/Chest WDL  Shoulder Blades WDL  Spine WDL  (R) Arm/Elbow/Hand WDL  (L) Arm/Elbow/Hand WDL  Abdomen WDL  Groin WDL  Scrotum/Coccyx/Buttocks WDL  (R) Leg WDL  (L) Leg WDL  (R) Heel/Foot/Toe WDL  (L) Heel/Foot/Toe WDL          Devices In Places N/A      Interventions In Place Pressure Redistribution Mattress    Possible Skin Injury No    Pictures Uploaded Into Epic N/A  Wound Consult Placed N/A  RN Wound Prevention Protocol Ordered No

## 2022-05-11 NOTE — ED PROVIDER NOTES
ED Provider Note    CHIEF COMPLAINT  Chief Complaint   Patient presents with   • Fever     Started last night     • Shaking     Started today while at work    • Headache     Stated today      HPI  Patient is a 46-year-old female with a history of anxiety who presents emergency room for evaluation of what she describes as chills, body aches and some lower abdominal discomfort.  Yesterday she began having a fever that she did not measure but had escalating amounts of shaking while she was at work earlier today.  She also has a small frontal headache, she has had some intermittent sore throat with nausea but no vomiting.  She does not have localized abdominal tenderness, no diarrheal illness, no cough or shortness of breath.  She has had a cholecystectomy in the past, denies any skin changes or lesions and is unsure about sick contacts.    Triage patient was noted to be afebrile, tachycardic into the 140s.    PPE Note: I personally donned full PPE for all patient encounters during this visit, including being clean-shaven with an N95 respirator mask, gloves, and goggles.     REVIEW OF SYSTEMS  See HPI for further details. All other systems are negative.     PAST MEDICAL HISTORY   has a past medical history of Hypertension, Other specified disorder of intestines, and Pain.    SOCIAL HISTORY  Social History     Tobacco Use   • Smoking status: Never Smoker   • Smokeless tobacco: Never Used   Vaping Use   • Vaping Use: Never used   Substance and Sexual Activity   • Alcohol use: No   • Drug use: No   • Sexual activity: Not on file       SURGICAL HISTORY   has a past surgical history that includes cholecystectomy; fusion, spine, lumbar, plif (3/13/2013); lumbar laminectomy diskectomy (3/13/2013); and lumbar decompression (3/13/2013).    CURRENT MEDICATIONS  Home Medications     Reviewed by Alicia Whatley R.N. (Registered Nurse) on 05/10/22 at 2111  Med List Status: Partial   Medication Last Dose Status   busPIRone  "(BUSPAR) 15 MG tablet  Active   hydrOXYzine HCl (ATARAX) 50 MG Tab  Active   METHADONE HCL PO  Active   omeprazole (PRILOSEC) 40 MG delayed-release capsule Not Taking Active   ondansetron (ZOFRAN ODT) 4 MG TABLET DISPERSIBLE  Active   raNITidine (ZANTAC) 150 MG Tab  Active                ALLERGIES  No Known Allergies    PHYSICAL EXAM  VITAL SIGNS: /67   Pulse (!) 104   Temp 37.6 °C (99.6 °F) (Oral)   Resp 13   Ht 1.6 m (5' 3\")   Wt 89.8 kg (197 lb 15.6 oz)   LMP 04/28/2022   SpO2 92%   BMI 35.07 kg/m²   Pulse ox interpretation: I interpret this pulse ox as normal.  Genl: F sitting in gurney comfortably, speaking clearly, appears in mild distress   Head: NC/AT   ENT: Mucous membranes dry, posterior pharynx erythematous without exudates, no asymmetry, uvula midline, nares patent bilaterally  Eyes: Normal sclera, pupils equal round reactive to light  Neck: Supple, FROM, no LAD appreciated  Pulmonary: Lungs are clear to auscultation bilaterally  Chest: No TTP  CV:  tachycardia, no murmur appreciated, pulses 2+ in both upper and lower extremities  Abdomen: soft, prepubic pain, no McBurney's point tenderness, no Pham sign, no ND; no rebound/guarding, no masses palpated, no HSM   : Flank tenderness, no true CVA tenderness.   Musculoskeletal: Pain free ROM of the neck. Moving upper and lower extremities and spontaneous in coordinated fashion  Neuro: A&Ox4 (person, place, time, situation), speech fluent, no neck rigidity, no meningismus, no cranial nerve abnormalities, no focal deficits appreciated, No cerebellar signs. Sensation is grossly intact in the distal upper and lower extremities.  5/5 strength in  and dorsiflexion/plantar flexion of the ankles  Psych: Patient has an appropriate affect and behavior  Skin: No rash or lesions.  No pallor or jaundice.  No cyanosis.  Warm and dry.     DIAGNOSTIC STUDIES / PROCEDURES    LABS  Labs Reviewed   CBC WITH DIFFERENTIAL - Abnormal; Notable for the " "following components:       Result Value    WBC 14.7 (*)     Neutrophils-Polys 81.40 (*)     Lymphocytes 11.70 (*)     Neutrophils (Absolute) 12.00 (*)     Monos (Absolute) 0.88 (*)     All other components within normal limits   COMP METABOLIC PANEL - Abnormal; Notable for the following components:    Sodium 133 (*)     Potassium 3.5 (*)     Glucose 112 (*)     All other components within normal limits   URINALYSIS - Abnormal; Notable for the following components:    Nitrite Positive (*)     Leukocyte Esterase Small (*)     Occult Blood Small (*)     All other components within normal limits   CRP QUANTITIVE (NON-CARDIAC) - Abnormal; Notable for the following components:    Stat C-Reactive Protein 7.44 (*)     All other components within normal limits   PROCALCITONIN - Abnormal; Notable for the following components:    Procalcitonin 4.48 (*)     All other components within normal limits   URINE MICROSCOPIC (W/UA) - Abnormal; Notable for the following components:    WBC 5-10 (*)     RBC 5-10 (*)     Bacteria Moderate (*)     All other components within normal limits   COV-2, FLU A/B, AND RSV BY PCR (Twyxt)   LACTIC ACID   ESTIMATED GFR   BLOOD CULTURE    Narrative:     1 of 2 for Blood Culture x 2 sites order. Per Hospital  Policy: Only change Specimen Src: to \"Line\" if specified by  physician order.   BLOOD CULTURE    Narrative:     2 of 2 blood culture x2  Sites order. Per Hospital Policy:  Only change Specimen Src: to \"Line\" if specified by physician  order.     RADIOLOGY  DX-CHEST-PORTABLE (1 VIEW)   Final Result         1. No acute cardiopulmonary abnormalities are identified.        COURSE & MEDICAL DECISION MAKING  Pertinent Labs & Imaging studies reviewed. (See chart for details)    DDX:  Sepsis, pneumonia, UTI, Jagdeep, viral syndrome, dehydration, electrolyte derangement    MDM    Initial evaluation at 2217:  Patient presented to the emergency room for symptoms as described above.  On initial assessment the " "patient had substantial tachycardia, was having described rigors and lower abdominal discomfort with some suprapubic tenderness on palpation.  Concern was for septic etiology and septic work-up was initiated with administration of fluids per protocol and administration of symptomatic medications.  Antibiotics are held off until source identification can be further differentiated.    Lab work was remarkable for a leukocytosis of 14.7, and elevated procalcitonin and CRP with very minimal changes on electrolyte panel.  No evidence of LFT elevations.  Urinalysis came back with bacteria, white cells and presence of leukocyte esterase and nitrites.  Clinically the patient has urosepsis, no clinical findings of substantial pyelonephritis though will be administered Rocephin and further fluid resuscitation was administered.  Her tachycardia improved from 147 down to 100, she is not having hypotension.  Mental status is unchanged.  COVID and flu is negative.  Patient will be admitted for further fluid resuscitation, trending of labs and transition from IV to p.o. medications.  Discussed with the hospitalist and admitted in guarded condition.    HYDRATION: Based on the patient's presentation of Sepsis and Tachycardia the patient was given IV fluids. IV Hydration was used because oral hydration was not adequate alone. Upon recheck following hydration, the patient was improved.    I examined the patient 5/11/2022 2:13 AM  Vital Signs:/67   Pulse (!) 104   Temp 37.6 °C (99.6 °F) (Oral)   Resp 13   Ht 1.6 m (5' 3\")   Wt 89.8 kg (197 lb 15.6 oz)   LMP 04/28/2022   SpO2 92%   BMI 35.07 kg/m²   Cardiac examination significant for Tachycardia  Pulmonary examination significant for Clear lung fileds  Capillary refill is brisk  Peripheral Pulse is 2+   Skin is normal    CRITICAL CARE:  I saw and evaluated this patient. I personally provided 30 minutes of critical care time to the patient excluding billable procedures and " directly and personally provided the following treatment and critical care management:  Critical Care Interventions  Multiple bedside assessments, coordination of care with family and other historical sources, Continuous hemodynamic and respiratory monitoring and Fluid resuscitation      FINAL IMPRESSION  Visit Diagnoses     ICD-10-CM   1. Rigors  R68.89   2. Chills  R68.83   3. Sepsis, due to unspecified organism, unspecified whether acute organ dysfunction present (HCC)  A41.9   4. Flank pain  R10.9   5. Acute cystitis without hematuria  N30.00       Electronically signed by: Shai Olivarez M.D., 5/10/2022 10:17 PM

## 2022-05-11 NOTE — PROGRESS NOTES
Patient was admitted earlier this morning for sepsis related to UTI.  On IVF & IV C3.   Feeling better.  But still complaining of diffuse abdominal pain.  KUB ordered.   Will follow final urine C & S.

## 2022-05-11 NOTE — DISCHARGE PLANNING
Anticipated Discharge Disposition: Anticipate home     Action: Chart review completed. Pt A&Ox4 on RA. 6 clicks scores of 24/24. No CM needs noted at this time.      Barriers to Discharge: medical clearance     Plan: f/u with medical team to discuss DC needs and barriers

## 2022-05-11 NOTE — PROGRESS NOTES
Received patient from the Emergency Department. Patient ambulated from the [facility] to the hospital bed with no assistance required. Patient is alert and oriented X 4. Safety precautions in place. Will continue to monitor patient.

## 2022-05-12 LAB
ANION GAP SERPL CALC-SCNC: 11 MMOL/L (ref 7–16)
BASOPHILS # BLD AUTO: 0.4 % (ref 0–1.8)
BASOPHILS # BLD: 0.05 K/UL (ref 0–0.12)
BUN SERPL-MCNC: 8 MG/DL (ref 8–22)
CALCIUM SERPL-MCNC: 8.1 MG/DL (ref 8.4–10.2)
CHLORIDE SERPL-SCNC: 102 MMOL/L (ref 96–112)
CO2 SERPL-SCNC: 22 MMOL/L (ref 20–33)
CREAT SERPL-MCNC: 0.83 MG/DL (ref 0.5–1.4)
EOSINOPHIL # BLD AUTO: 0.01 K/UL (ref 0–0.51)
EOSINOPHIL NFR BLD: 0.1 % (ref 0–6.9)
ERYTHROCYTE [DISTWIDTH] IN BLOOD BY AUTOMATED COUNT: 45.2 FL (ref 35.9–50)
GFR SERPLBLD CREATININE-BSD FMLA CKD-EPI: 88 ML/MIN/1.73 M 2
GLUCOSE SERPL-MCNC: 93 MG/DL (ref 65–99)
HCT VFR BLD AUTO: 36.1 % (ref 37–47)
HGB BLD-MCNC: 11.6 G/DL (ref 12–16)
IMM GRANULOCYTES # BLD AUTO: 0.07 K/UL (ref 0–0.11)
IMM GRANULOCYTES NFR BLD AUTO: 0.6 % (ref 0–0.9)
LYMPHOCYTES # BLD AUTO: 2.6 K/UL (ref 1–4.8)
LYMPHOCYTES NFR BLD: 20.7 % (ref 22–41)
MCH RBC QN AUTO: 30.1 PG (ref 27–33)
MCHC RBC AUTO-ENTMCNC: 32.1 G/DL (ref 33.6–35)
MCV RBC AUTO: 93.5 FL (ref 81.4–97.8)
MONOCYTES # BLD AUTO: 1.02 K/UL (ref 0–0.85)
MONOCYTES NFR BLD AUTO: 8.1 % (ref 0–13.4)
NEUTROPHILS # BLD AUTO: 8.83 K/UL (ref 2–7.15)
NEUTROPHILS NFR BLD: 70.1 % (ref 44–72)
NRBC # BLD AUTO: 0 K/UL
NRBC BLD-RTO: 0 /100 WBC
PLATELET # BLD AUTO: 193 K/UL (ref 164–446)
PMV BLD AUTO: 9.7 FL (ref 9–12.9)
POTASSIUM SERPL-SCNC: 3.9 MMOL/L (ref 3.6–5.5)
RBC # BLD AUTO: 3.86 M/UL (ref 4.2–5.4)
SODIUM SERPL-SCNC: 135 MMOL/L (ref 135–145)
WBC # BLD AUTO: 12.6 K/UL (ref 4.8–10.8)

## 2022-05-12 PROCEDURE — A9270 NON-COVERED ITEM OR SERVICE: HCPCS | Performed by: STUDENT IN AN ORGANIZED HEALTH CARE EDUCATION/TRAINING PROGRAM

## 2022-05-12 PROCEDURE — 700102 HCHG RX REV CODE 250 W/ 637 OVERRIDE(OP): Performed by: STUDENT IN AN ORGANIZED HEALTH CARE EDUCATION/TRAINING PROGRAM

## 2022-05-12 PROCEDURE — 85025 COMPLETE CBC W/AUTO DIFF WBC: CPT

## 2022-05-12 PROCEDURE — 700111 HCHG RX REV CODE 636 W/ 250 OVERRIDE (IP): Performed by: STUDENT IN AN ORGANIZED HEALTH CARE EDUCATION/TRAINING PROGRAM

## 2022-05-12 PROCEDURE — 700102 HCHG RX REV CODE 250 W/ 637 OVERRIDE(OP): Performed by: INTERNAL MEDICINE

## 2022-05-12 PROCEDURE — 99233 SBSQ HOSP IP/OBS HIGH 50: CPT | Performed by: STUDENT IN AN ORGANIZED HEALTH CARE EDUCATION/TRAINING PROGRAM

## 2022-05-12 PROCEDURE — 94760 N-INVAS EAR/PLS OXIMETRY 1: CPT

## 2022-05-12 PROCEDURE — 80048 BASIC METABOLIC PNL TOTAL CA: CPT

## 2022-05-12 PROCEDURE — 700105 HCHG RX REV CODE 258: Performed by: STUDENT IN AN ORGANIZED HEALTH CARE EDUCATION/TRAINING PROGRAM

## 2022-05-12 PROCEDURE — A9270 NON-COVERED ITEM OR SERVICE: HCPCS | Performed by: INTERNAL MEDICINE

## 2022-05-12 PROCEDURE — 36415 COLL VENOUS BLD VENIPUNCTURE: CPT

## 2022-05-12 PROCEDURE — 770006 HCHG ROOM/CARE - MED/SURG/GYN SEMI*

## 2022-05-12 RX ORDER — SUMATRIPTAN 25 MG/1
25 TABLET, FILM COATED ORAL ONCE
Status: COMPLETED | OUTPATIENT
Start: 2022-05-12 | End: 2022-05-12

## 2022-05-12 RX ADMIN — SUMATRIPTAN SUCCINATE 25 MG: 25 TABLET ORAL at 14:11

## 2022-05-12 RX ADMIN — CEFTRIAXONE SODIUM 2 G: 2 INJECTION, POWDER, FOR SOLUTION INTRAMUSCULAR; INTRAVENOUS at 02:35

## 2022-05-12 RX ADMIN — SENNOSIDES AND DOCUSATE SODIUM 2 TABLET: 50; 8.6 TABLET ORAL at 05:19

## 2022-05-12 RX ADMIN — RIVAROXABAN 10 MG: 10 TABLET, FILM COATED ORAL at 05:19

## 2022-05-12 RX ADMIN — ACETAMINOPHEN 650 MG: 325 TABLET, FILM COATED ORAL at 11:24

## 2022-05-12 RX ADMIN — PIPERACILLIN AND TAZOBACTAM 3.38 G: 3; .375 INJECTION, POWDER, LYOPHILIZED, FOR SOLUTION INTRAVENOUS; PARENTERAL at 20:37

## 2022-05-12 RX ADMIN — PIPERACILLIN AND TAZOBACTAM 3.38 G: 3; .375 INJECTION, POWDER, LYOPHILIZED, FOR SOLUTION INTRAVENOUS; PARENTERAL at 08:32

## 2022-05-12 RX ADMIN — PIPERACILLIN AND TAZOBACTAM 3.38 G: 3; .375 INJECTION, POWDER, LYOPHILIZED, FOR SOLUTION INTRAVENOUS; PARENTERAL at 11:19

## 2022-05-12 RX ADMIN — METHADONE HYDROCHLORIDE 85 MG: 10 TABLET ORAL at 06:13

## 2022-05-12 RX ADMIN — BUSPIRONE HYDROCHLORIDE 15 MG: 5 TABLET ORAL at 17:40

## 2022-05-12 RX ADMIN — SODIUM CHLORIDE: 9 INJECTION, SOLUTION INTRAVENOUS at 20:37

## 2022-05-12 RX ADMIN — BUSPIRONE HYDROCHLORIDE 15 MG: 5 TABLET ORAL at 05:19

## 2022-05-12 RX ADMIN — ACETAMINOPHEN 650 MG: 325 TABLET, FILM COATED ORAL at 20:36

## 2022-05-12 ASSESSMENT — ENCOUNTER SYMPTOMS
DIARRHEA: 0
WHEEZING: 0
DIZZINESS: 0
FEVER: 0
NERVOUS/ANXIOUS: 0
MYALGIAS: 0
PALPITATIONS: 0
SINUS PAIN: 0
COUGH: 0
CHILLS: 0
FOCAL WEAKNESS: 0
CONSTIPATION: 0
NAUSEA: 0
BLURRED VISION: 0
SHORTNESS OF BREATH: 0
HEADACHES: 0
DOUBLE VISION: 0
SPUTUM PRODUCTION: 0
SORE THROAT: 0
VOMITING: 0
ABDOMINAL PAIN: 1

## 2022-05-12 ASSESSMENT — PAIN DESCRIPTION - PAIN TYPE
TYPE: ACUTE PAIN
TYPE: ACUTE PAIN
TYPE: CHRONIC PAIN
TYPE: CHRONIC PAIN
TYPE: ACUTE PAIN
TYPE: ACUTE PAIN

## 2022-05-12 NOTE — CARE PLAN
The patient is Watcher - Medium risk of patient condition declining or worsening    Shift Goals  Clinical Goals: Pt will state pain is at tolerable level  Patient Goals: Able to state pain is at tolerable level    Progress made toward(s) clinical / shift goals:  Pt states pain is at tolerable level this morning. Pt states the toradol took pain down to a 3/10.    Patient is not progressing towards the following goals: None

## 2022-05-12 NOTE — PROGRESS NOTES
Pt requested pain medication for 8/10 lower back pain. MD notified. One-time dose of toradol ordered. Pt states toradol brought pain down to 3/10.  Pt tachycardic throughout shift; -120.  Pt tolerating RA.  Pt tolerating current diet. No c/o N/V.  Pt voiding.  Pt ambulating independently without needing assistance.  Chart check done.    2327: Pt has PO temperature of 103.0F. Pt given tylenol for fever, toradol for pain, ice packs, and had numerous blankets removed from pt's self. MD notified of 103.0F PO temperature and informed of current treatment. No new orders received.  0003: Temperature down to 100.7F PO.  0518: Temperature down to 98.9F PO.

## 2022-05-12 NOTE — CARE PLAN
The patient is Stable - Low risk of patient condition declining or worsening    Shift Goals  Clinical Goals: Keep headache pain below 3/10.  Patient Goals: Patient didn't get rest last night and wants a 2 hour nap  Family Goals: manage pain    Progress made toward(s) clinical / shift goals:  Pt given imitrex per MAR to help keep headache pain below 3/10.     Patient is not progressing towards the following goals:

## 2022-05-12 NOTE — PROGRESS NOTES
Hospital Medicine Daily Progress Note    Date of Service  5/12/2022    Chief Complaint  Lakesha Fagan is a 46 y.o. female admitted 5/10/2022 with sepsis / UTI     Hospital Course  Patient was admitted earlier this morning for sepsis related to UTI.  On IVF & IV C3.   Feeling better.  But still complaining of diffuse abdominal pain.  KUB ordered.   Will follow final urine C & S.     Interval Problem Update  Patient was seen and examined at bedside.  Febrile episodes overnight while the patient is on IV ceftriaxone.  Escalated antibiotic to Zosyn.  KUB nil acute.  Follow final urine C&S.    I have personally seen and examined the patient at bedside. I discussed the plan of care with patient, bedside RN, charge RN,  and pharmacy.    Consultants/Specialty  N/A    Code Status  Full Code    Disposition  Patient is not medically cleared for discharge.   Anticipate discharge to to home with close outpatient follow-up.  I have placed the appropriate orders for post-discharge needs.    Review of Systems  Review of Systems   Constitutional: Negative for chills, fever and malaise/fatigue.   HENT: Negative for congestion, ear discharge, ear pain, sinus pain and sore throat.    Eyes: Negative for blurred vision and double vision.   Respiratory: Negative for cough, sputum production, shortness of breath and wheezing.    Cardiovascular: Negative for chest pain, palpitations and leg swelling.   Gastrointestinal: Positive for abdominal pain. Negative for constipation, diarrhea, nausea and vomiting.   Genitourinary: Negative for dysuria, frequency and urgency.   Musculoskeletal: Negative for myalgias.   Neurological: Negative for dizziness, focal weakness and headaches.   Psychiatric/Behavioral: The patient is not nervous/anxious.         Physical Exam  Temp:  [37.2 °C (98.9 °F)-39.4 °C (103 °F)] 37.5 °C (99.5 °F)  Pulse:  [] 94  Resp:  [18-20] 18  BP: ()/(55-76) 96/66  SpO2:  [92 %-97 %] 92  %    Physical Exam  Constitutional:       General: She is not in acute distress.  HENT:      Head: Normocephalic and atraumatic.      Nose: Nose normal.      Mouth/Throat:      Mouth: Mucous membranes are moist.      Pharynx: No posterior oropharyngeal erythema.   Eyes:      General: No scleral icterus.     Extraocular Movements: Extraocular movements intact.      Conjunctiva/sclera: Conjunctivae normal.      Pupils: Pupils are equal, round, and reactive to light.   Cardiovascular:      Rate and Rhythm: Normal rate and regular rhythm.      Pulses: Normal pulses.      Heart sounds: Normal heart sounds. No murmur heard.    No gallop.   Pulmonary:      Effort: Pulmonary effort is normal.      Breath sounds: Normal breath sounds. No stridor. No wheezing, rhonchi or rales.   Abdominal:      General: Bowel sounds are normal.      Palpations: Abdomen is soft.      Tenderness: There is abdominal tenderness.   Musculoskeletal:         General: No swelling or tenderness.      Cervical back: Normal range of motion and neck supple. No rigidity.   Skin:     General: Skin is warm.   Neurological:      General: No focal deficit present.      Mental Status: She is alert and oriented to person, place, and time.   Psychiatric:         Mood and Affect: Mood normal.         Behavior: Behavior normal.         Fluids    Intake/Output Summary (Last 24 hours) at 5/12/2022 1347  Last data filed at 5/12/2022 0400  Gross per 24 hour   Intake 3100.84 ml   Output --   Net 3100.84 ml       Laboratory  Recent Labs     05/10/22  2238 05/11/22  0840 05/12/22  0236   WBC 14.7* 11.2* 12.6*   RBC 4.25 4.14* 3.86*   HEMOGLOBIN 13.0 12.8 11.6*   HEMATOCRIT 38.1 38.1 36.1*   MCV 89.6 92.0 93.5   MCH 30.6 30.9 30.1   MCHC 34.1 33.6 32.1*   RDW 42.5 44.7 45.2   PLATELETCT 222 219 193   MPV 9.1 9.3 9.7     Recent Labs     05/10/22  2238 05/11/22  0840 05/12/22  0236   SODIUM 133* 139 135   POTASSIUM 3.5* 3.8 3.9   CHLORIDE 97 103 102   CO2 24 26 22    GLUCOSE 112* 102* 93   BUN 11 9 8   CREATININE 1.02 0.84 0.83   CALCIUM 8.7 8.9 8.1*     Recent Labs     05/11/22  0840   INR 1.16*               Imaging  [id number] VIEW   Final Result      No acute findings. Small amount of colonic stool.      DX-CHEST-PORTABLE (1 VIEW)   Final Result         1. No acute cardiopulmonary abnormalities are identified.           Assessment/Plan  * Sepsis (HCC)- (present on admission)  Assessment & Plan  -This is Sepsis Present on admission  SIRS criteria identified on my evaluation include: Tachycardia, with heart rate greater than 90 BPM and Leukocytosis, with WBC greater than 12,000  Source is urinary tract infection  Sepsis protocol initiated  Fluid resuscitation ordered per protocol  Crystalloid Fluid Administration: Fluid resuscitation ordered per standard protocol - 30 mL/kg per current or ideal body weight  IV antibiotics as appropriate for source of sepsis  Reassessment: I have reassessed the patient's hemodynamic status  -Start IV Rocephin  -Await culture results, previous urine culture showed pansensitive Proteus  -Trend lactic acid level  -Patient has a risk of worsening, closely monitor her hemodynamics  -Patient does require inpatient hospitalization to adequately treat sepsis causing significant tachycardia, unfortunately, I do not feel she would be able to tolerate oral antibiotic at this point in time, if she was sent home, she runs a high risk of her sepsis worsening, developing shock    Anxiety- (present on admission)  Assessment & Plan  - Chronic, no acute worsening  -Continue home BuSpar and hydroxyzine    Pain, chronic- (present on admission)  Assessment & Plan  - Continue home methadone    Hyperglycemia- (present on admission)  Assessment & Plan  - Mild, no need for coverage    Hypokalemia- (present on admission)  Assessment & Plan  - Place IV potassium and with IV fluids  -Repeat BMP in the morning    Hyponatremia- (present on admission)  Assessment &  Plan  - Mild, due to dehydration  -Start IV fluids  -Repeat BMP in the morning    UTI (urinary tract infection)- (present on admission)  Assessment & Plan  - Start IV Rocephin  -Await culture results  -Causing sepsis         VTE prophylaxis: SCDs/TEDs and Xarelto 10 mg daily as prophylaxis    I have performed a physical exam and reviewed and updated ROS and Plan today (5/12/2022). In review of yesterday's note (5/11/2022), there are no changes except as documented above.

## 2022-05-13 VITALS
TEMPERATURE: 97.4 F | OXYGEN SATURATION: 92 % | HEIGHT: 63 IN | HEART RATE: 87 BPM | RESPIRATION RATE: 20 BRPM | BODY MASS INDEX: 35.39 KG/M2 | WEIGHT: 199.74 LBS | SYSTOLIC BLOOD PRESSURE: 95 MMHG | DIASTOLIC BLOOD PRESSURE: 59 MMHG

## 2022-05-13 PROBLEM — E87.6 HYPOKALEMIA: Status: RESOLVED | Noted: 2022-05-11 | Resolved: 2022-05-13

## 2022-05-13 PROBLEM — R73.9 HYPERGLYCEMIA: Status: RESOLVED | Noted: 2022-05-11 | Resolved: 2022-05-13

## 2022-05-13 PROBLEM — A41.9 SEPSIS (HCC): Status: RESOLVED | Noted: 2022-05-11 | Resolved: 2022-05-13

## 2022-05-13 PROBLEM — E87.1 HYPONATREMIA: Status: RESOLVED | Noted: 2022-05-11 | Resolved: 2022-05-13

## 2022-05-13 LAB
ALBUMIN SERPL BCP-MCNC: 3.4 G/DL (ref 3.2–4.9)
ALBUMIN/GLOB SERPL: 0.9 G/DL
ALP SERPL-CCNC: 95 U/L (ref 30–99)
ALT SERPL-CCNC: 11 U/L (ref 2–50)
ANION GAP SERPL CALC-SCNC: 15 MMOL/L (ref 7–16)
AST SERPL-CCNC: 15 U/L (ref 12–45)
BASOPHILS # BLD AUTO: 0.3 % (ref 0–1.8)
BASOPHILS # BLD: 0.03 K/UL (ref 0–0.12)
BILIRUB SERPL-MCNC: 0.4 MG/DL (ref 0.1–1.5)
BUN SERPL-MCNC: 8 MG/DL (ref 8–22)
CALCIUM SERPL-MCNC: 8.7 MG/DL (ref 8.4–10.2)
CHLORIDE SERPL-SCNC: 103 MMOL/L (ref 96–112)
CO2 SERPL-SCNC: 20 MMOL/L (ref 20–33)
CREAT SERPL-MCNC: 0.82 MG/DL (ref 0.5–1.4)
EOSINOPHIL # BLD AUTO: 0.1 K/UL (ref 0–0.51)
EOSINOPHIL NFR BLD: 0.9 % (ref 0–6.9)
ERYTHROCYTE [DISTWIDTH] IN BLOOD BY AUTOMATED COUNT: 46.7 FL (ref 35.9–50)
GFR SERPLBLD CREATININE-BSD FMLA CKD-EPI: 89 ML/MIN/1.73 M 2
GLOBULIN SER CALC-MCNC: 3.8 G/DL (ref 1.9–3.5)
GLUCOSE SERPL-MCNC: 86 MG/DL (ref 65–99)
HCT VFR BLD AUTO: 38.5 % (ref 37–47)
HGB BLD-MCNC: 12.3 G/DL (ref 12–16)
IMM GRANULOCYTES # BLD AUTO: 0.05 K/UL (ref 0–0.11)
IMM GRANULOCYTES NFR BLD AUTO: 0.5 % (ref 0–0.9)
LYMPHOCYTES # BLD AUTO: 2.95 K/UL (ref 1–4.8)
LYMPHOCYTES NFR BLD: 28 % (ref 22–41)
MCH RBC QN AUTO: 30.4 PG (ref 27–33)
MCHC RBC AUTO-ENTMCNC: 31.9 G/DL (ref 33.6–35)
MCV RBC AUTO: 95.3 FL (ref 81.4–97.8)
MONOCYTES # BLD AUTO: 0.88 K/UL (ref 0–0.85)
MONOCYTES NFR BLD AUTO: 8.3 % (ref 0–13.4)
NEUTROPHILS # BLD AUTO: 6.54 K/UL (ref 2–7.15)
NEUTROPHILS NFR BLD: 62 % (ref 44–72)
NRBC # BLD AUTO: 0 K/UL
NRBC BLD-RTO: 0 /100 WBC
PLATELET # BLD AUTO: 190 K/UL (ref 164–446)
PMV BLD AUTO: 9.8 FL (ref 9–12.9)
POTASSIUM SERPL-SCNC: 4.1 MMOL/L (ref 3.6–5.5)
PROT SERPL-MCNC: 7.2 G/DL (ref 6–8.2)
RBC # BLD AUTO: 4.04 M/UL (ref 4.2–5.4)
SODIUM SERPL-SCNC: 138 MMOL/L (ref 135–145)
WBC # BLD AUTO: 10.6 K/UL (ref 4.8–10.8)

## 2022-05-13 PROCEDURE — 700102 HCHG RX REV CODE 250 W/ 637 OVERRIDE(OP): Performed by: INTERNAL MEDICINE

## 2022-05-13 PROCEDURE — 700105 HCHG RX REV CODE 258: Performed by: STUDENT IN AN ORGANIZED HEALTH CARE EDUCATION/TRAINING PROGRAM

## 2022-05-13 PROCEDURE — 85025 COMPLETE CBC W/AUTO DIFF WBC: CPT

## 2022-05-13 PROCEDURE — A9270 NON-COVERED ITEM OR SERVICE: HCPCS | Performed by: INTERNAL MEDICINE

## 2022-05-13 PROCEDURE — 80053 COMPREHEN METABOLIC PANEL: CPT

## 2022-05-13 PROCEDURE — 99239 HOSP IP/OBS DSCHRG MGMT >30: CPT | Performed by: STUDENT IN AN ORGANIZED HEALTH CARE EDUCATION/TRAINING PROGRAM

## 2022-05-13 PROCEDURE — 36415 COLL VENOUS BLD VENIPUNCTURE: CPT

## 2022-05-13 PROCEDURE — 700111 HCHG RX REV CODE 636 W/ 250 OVERRIDE (IP): Performed by: STUDENT IN AN ORGANIZED HEALTH CARE EDUCATION/TRAINING PROGRAM

## 2022-05-13 RX ORDER — SULFAMETHOXAZOLE AND TRIMETHOPRIM 800; 160 MG/1; MG/1
1 TABLET ORAL 2 TIMES DAILY
Qty: 10 TABLET | Refills: 0 | Status: SHIPPED | OUTPATIENT
Start: 2022-05-13 | End: 2022-05-18

## 2022-05-13 RX ADMIN — BUSPIRONE HYDROCHLORIDE 15 MG: 5 TABLET ORAL at 05:18

## 2022-05-13 RX ADMIN — METHADONE HYDROCHLORIDE 85 MG: 10 TABLET ORAL at 05:15

## 2022-05-13 RX ADMIN — PIPERACILLIN AND TAZOBACTAM 3.38 G: 3; .375 INJECTION, POWDER, LYOPHILIZED, FOR SOLUTION INTRAVENOUS; PARENTERAL at 05:21

## 2022-05-13 RX ADMIN — SENNOSIDES AND DOCUSATE SODIUM 2 TABLET: 50; 8.6 TABLET ORAL at 05:19

## 2022-05-13 RX ADMIN — RIVAROXABAN 10 MG: 10 TABLET, FILM COATED ORAL at 05:19

## 2022-05-13 RX ADMIN — ACETAMINOPHEN 650 MG: 325 TABLET, FILM COATED ORAL at 10:17

## 2022-05-13 NOTE — DISCHARGE SUMMARY
Discharge Summary    CHIEF COMPLAINT ON ADMISSION  Chief Complaint   Patient presents with   • Fever     Started last night     • Shaking     Started today while at work    • Headache     Stated today        Reason for Admission  Fever     Admission Date  5/10/2022    CODE STATUS  Full Code    HPI & HOSPITAL COURSE   A 46 y.o. female who presented 5/10/2022 with fever and chills.  Patient states her symptoms initially started 2 days ago with mild chills.  c/o  severe episode of chills with rigors, because of this she presented to the emergency department.  She did test negative for COVID however she was noted to have a urinary tract infection.  She was noted to have an elevated heart rate at 140. Admitted for sepsis related to UTI.  Resident initiated, patient received sepsis IV fluid boluses per protocol.  On IVF & IV C3 which later switched to IV Zosyn.  Patient is feeling better.  KUB nil acute.    Discussed with micro lab, urine culture no growth for 24 hours. Will follow urine cx.   Patient will be discharged home with oral antibiotics and close PCP follow-up.    Therefore, she is discharged in good and stable condition to home with close outpatient follow-up.    The patient met 2-midnight criteria for an inpatient stay at the time of discharge.    Discharge Date  5/13/2022    FOLLOW UP ITEMS POST DISCHARGE  PCP     DISCHARGE DIAGNOSES  Principal Problem (Resolved):    Sepsis (HCC) POA: Yes  Active Problems:    UTI (urinary tract infection) POA: Yes    Pain, chronic POA: Yes    Anxiety POA: Yes  Resolved Problems:    Hyponatremia POA: Yes    Hypokalemia POA: Yes    Hyperglycemia POA: Yes      FOLLOW UP  No future appointments.  No follow-up provider specified.    MEDICATIONS ON DISCHARGE     Medication List      START taking these medications      Instructions   sulfamethoxazole-trimethoprim 800-160 MG tablet  Commonly known as: BACTRIM DS   Take 1 Tablet by mouth in the morning and 1 Tablet in the evening. Do  all this for 5 days.  Dose: 1 Tablet        CHANGE how you take these medications      Instructions   omeprazole 40 MG delayed-release capsule  What changed:   · when to take this  · reasons to take this  Commonly known as: PRILOSEC   Take 1 capsule by mouth every day.  Dose: 40 mg        CONTINUE taking these medications      Instructions   busPIRone 15 MG tablet  Commonly known as: BUSPAR   Take 15 mg by mouth 2 times a day as needed. Indications: Anxiety Disorder  Dose: 15 mg     hydrOXYzine HCl 50 MG Tabs  Commonly known as: ATARAX   Take 50 mg by mouth 3 times a day as needed for Anxiety.  Dose: 50 mg     METHADONE HCL PO   Take 85 mg by mouth every day. [phone] to verify pts dose 5/11/2022  Dose: 85 mg            Allergies  No Known Allergies    DIET  Orders Placed This Encounter   Procedures   • Diet Order Diet: Regular     Standing Status:   Standing     Number of Occurrences:   1     Order Specific Question:   Diet:     Answer:   Regular [1]       ACTIVITY  As tolerated.  Weight bearing as tolerated    CONSULTATIONS  None     PROCEDURES  [id number] VIEW   Final Result       No acute findings. Small amount of colonic stool.       DX-CHEST-PORTABLE (1 VIEW)   Final Result           1. No acute cardiopulmonary abnormalities are identified.         LABORATORY  Lab Results   Component Value Date    SODIUM 138 05/13/2022    POTASSIUM 4.1 05/13/2022    CHLORIDE 103 05/13/2022    CO2 20 05/13/2022    GLUCOSE 86 05/13/2022    BUN 8 05/13/2022    CREATININE 0.82 05/13/2022        Lab Results   Component Value Date    WBC 10.6 05/13/2022    HEMOGLOBIN 12.3 05/13/2022    HEMATOCRIT 38.5 05/13/2022    PLATELETCT 190 05/13/2022        Total time of the discharge process exceeds 43 minutes.

## 2022-05-13 NOTE — PATIENT COMMUNICATION
Report received from the night nurse. Assumed care at 0700. Reviewed labs, medications, and orders.     Patient in bed resting. Safety precautions in place. Bed in lowered and locked position. Call light within reach. Updated on plan of care. Hourly rounding in place. Educated the pt on calling for help if needed. Pt verbalized understanding.

## 2022-05-13 NOTE — DISCHARGE INSTRUCTIONS
Discharge Instructions    Discharged to home by car with relative. Discharged via wheelchair, hospital escort: Yes.  Special equipment needed: Not Applicable    Be sure to schedule a follow-up appointment with your primary care doctor or any specialists as instructed.     Discharge Plan:   Diet Plan: Discussed  Activity Level: Discussed  Confirmed Follow up Appointment: Patient to Call and Schedule Appointment  Confirmed Symptoms Management: Discussed  Medication Reconciliation Updated: Yes    I understand that a diet low in cholesterol, fat, and sodium is recommended for good health. Unless I have been given specific instructions below for another diet, I accept this instruction as my diet prescription.   Other diet: regular    Special Instructions: Sepsis, Diagnosis, Adult  Sepsis is a serious bodily reaction to an infection. The infection that triggers sepsis may be from a bacteria, virus, or fungus. Sepsis can result from an infection in any part of your body. Infections that commonly lead to sepsis include skin, lung, and urinary tract infections.  Sepsis is a medical emergency that must be treated right away in a hospital. In severe cases, it can lead to septic shock. Septic shock can weaken your heart and cause your blood pressure to drop. This can cause your central nervous system and your body's organs to stop working.  What are the causes?  This condition is caused by a severe reaction to infections from bacteria, viruses, or fungus. The germs that most often lead to sepsis include:  Escherichia coli (E. coli) bacteria.  Staphylococcus aureus (staph) bacteria.  Some types of Streptococcus bacteria.  The most common infections affect these organs:  The lung (pneumonia).  The kidneys or bladder (urinary tract infection).  The skin (cellulitis).  The bowel, gallbladder, or pancreas.  What increases the risk?  You are more likely to develop this condition if:  Your body's disease-fighting system (immune  system) is weakened.  You are age 65 or older.  You are male.  You had surgery or you have been hospitalized.  You have these devices inserted into your body:  A small, thin tube (catheter).  IV line.  Breathing tube.  Drainage tube.  You are not getting enough nutrients from food (malnourished).  You have a long-term (chronic) disease, such as cancer, lung disease, kidney disease, or diabetes.  You are .  What are the signs or symptoms?  Symptoms of this condition may include:  Fever.  Chills or feeling very cold.  Confusion or anxiety.  Fatigue.  Muscle aches.  Shortness of breath.  Nausea and vomiting.  Urinating much less than usual.  Fast heart rate (tachycardia).  Rapid breathing (hyperventilation).  Changes in skin color. Your skin may look blotchy, pale, or blue.  Cool, clammy, or sweaty skin.  Skin rash.  Other symptoms depend on the source of your infection.  How is this diagnosed?  This condition is diagnosed based on:  Your symptoms.  Your medical history.  A physical exam.  Other tests may also be done to find out the cause of the infection and how severe the sepsis is. These tests may include:  Blood tests.  Urine tests.  Swabs from other areas of your body that may have an infection. These samples may be tested (cultured) to find out what type of bacteria is causing the infection.  Chest X-ray to check for pneumonia. Other imaging tests, such as a CT scan, may also be done.  Lumbar puncture. This removes a small amount of the fluid that surrounds your brain and spinal cord. The fluid is then examined for infection.  How is this treated?  This condition must be treated in a hospital. Based on the cause of your infection, you may be given an antibiotic, antiviral, or antifungal medicine.  You may also receive:  Fluids through an IV.  Oxygen and breathing assistance.  Medicines to increase your blood pressure.  Kidney dialysis. This process cleans your blood if your kidneys have  failed.  Surgery to remove infected tissue.  Blood transfusion if needed.  Medicine to prevent blood clots.  Nutrients to correct imbalances in basic body function (metabolism). You may:  Receive important salts and minerals (electrolytes) through an IV.  Have your blood sugar level adjusted.  Follow these instructions at home:  Medicines    Take over-the-counter and prescription medicines only as told by your health care provider.  If you were prescribed an antibiotic, antiviral, or antifungal medicine, take it as told by your health care provider. Do not stop taking the medicine even if you start to feel better.  General instructions  If you have a catheter or other indwelling device, ask to have it removed as soon as possible.  Keep all follow-up visits as told by your health care provider. This is important.  Contact a health care provider if:  You do not feel like you are getting better or regaining strength.  You are having trouble coping with your recovery.  You frequently feel tired.  You feel worse or do not seem to get better after surgery.  You think you may have an infection after surgery.  Get help right away if:  You have any symptoms of sepsis.  You have difficulty breathing.  You have a rapid or skipping heartbeat.  You become confused or disoriented.  You have a high fever.  Your skin becomes blotchy, pale, or blue.  You have an infection that is getting worse or not getting better.  These symptoms may represent a serious problem that is an emergency. Do not wait to see if the symptoms will go away. Get medical help right away. Call your local emergency services (911 in the [place] Do not drive yourself to the hospital.  Summary  Sepsis is a medical emergency that requires immediate treatment in a hospital.  This condition is caused by a severe reaction to infections from bacteria, viruses, or fungus.  Based on the cause of your infection, you may be given an antibiotic, antiviral, or antifungal  medicine.  Treatment may also include IV fluids, breathing assistance, and kidney dialysis.  This information is not intended to replace advice given to you by your health care provider. Make sure you discuss any questions you have with your health care provider.  Document Released: 09/15/2004 Document Revised: 07/26/2019 Document Reviewed: 07/26/2019  [employer] Patient Education © 2020 [employer]    Is patient discharged on Warfarin / Coumadin?   No     Depression / Suicide Risk    As you are discharged from this [facility] facility, it is important to learn how to keep safe from harming yourself.    Recognize the warning signs:  Abrupt changes in personality, positive or negative- including increase in energy   Giving away possessions  Change in eating patterns- significant weight changes-  positive or negative  Change in sleeping patterns- unable to sleep or sleeping all the time   Unwillingness or inability to communicate  Depression  Unusual sadness, discouragement and loneliness  Talk of wanting to die  Neglect of personal appearance   Rebelliousness- reckless behavior  Withdrawal from people/activities they love  Confusion- inability to concentrate     If you or a loved one observes any of these behaviors or has concerns about self-harm, here's what you can do:  Talk about it- your feelings and reasons for harming yourself  Remove any means that you might use to hurt yourself (examples: pills, rope, extension cords, firearm)  Get professional help from the community (Mental Health, Substance Abuse, psychological counseling)  Do not be alone:Call your Safe Contact- someone whom you trust who will be there for you.  Call your local CRISIS HOTLINE 256-0218 or 644-503-3738  Call your local Children's Mobile Crisis Response Team [place] (375) 381-5902 or www.[employer]  Call the toll free National Suicide Prevention Hotlines   National Suicide Prevention Lifeline 537-807-QDJX (8212)  National [place]  Line Network 800-SUICIDE (598-1149)      Sulfamethoxazole; Trimethoprim, SMX-TMP tablets  What is this medicine?  SULFAMETHOXAZOLE; TRIMETHOPRIM or SMX-TMP (suhl fuh meth OK ish zohl; trye METH oh prim) is a combination of a sulfonamide antibiotic and a second antibiotic, trimethoprim. It is used to treat or prevent certain kinds of bacterial infections. It will not work for colds, flu, or other viral infections.  This medicine may be used for other purposes; ask your health care provider or pharmacist if you have questions.  COMMON BRAND NAME(S): Bacter-Aid DS, Bactrim, Bactrim DS, Septra, Septra DS  What should I tell my health care provider before I take this medicine?  They need to know if you have any of these conditions:  anemia  asthma  being treated with anticonvulsants  if you frequently drink alcohol containing drinks  kidney disease  liver disease  low level of folic acid or glucose-6-phosphate dehydrogenase  poor nutrition or malabsorption  porphyria  severe allergies  thyroid disorder  an unusual or allergic reaction to sulfamethoxazole, trimethoprim, sulfa drugs, other medicines, foods, dyes, or preservatives  pregnant or trying to get pregnant  breast-feeding  How should I use this medicine?  Take this medicine by mouth with a full glass of water. Follow the directions on the prescription label. Take your medicine at regular intervals. Do not take it more often than directed. Do not skip doses or stop your medicine early.  Talk to your pediatrician regarding the use of this medicine in children. Special care may be needed. This medicine has been used in children as young as 2 months of age.  Overdosage: If you think you have taken too much of this medicine contact a poison control center or emergency room at once.  NOTE: This medicine is only for you. Do not share this medicine with others.  What if I miss a dose?  If you miss a dose, take it as soon as you can. If it is almost time for your next  dose, take only that dose. Do not take double or extra doses.  What may interact with this medicine?  Do not take this medicine with any of the following medications:  aminobenzoate potassium  dofetilide  metronidazole  This medicine may also interact with the following medications:  ACE inhibitors like benazepril, enalapril, lisinopril, and ramipril  birth control pills  cyclosporine  digoxin  diuretics  indomethacin  medicines for diabetes  methenamine  methotrexate  phenytoin  potassium supplements  pyrimethamine  sulfinpyrazone  tricyclic antidepressants  warfarin  This list may not describe all possible interactions. Give your health care provider a list of all the medicines, herbs, non-prescription drugs, or dietary supplements you use. Also tell them if you smoke, drink alcohol, or use illegal drugs. Some items may interact with your medicine.  What should I watch for while using this medicine?  Tell your doctor or health care professional if your symptoms do not improve. Drink several glasses of water a day to reduce the risk of kidney problems.  Do not treat diarrhea with over the counter products. Contact your doctor if you have diarrhea that lasts more than 2 days or if it is severe and watery.  This medicine can make you more sensitive to the sun. Keep out of the sun. If you cannot avoid being in the sun, wear protective clothing and use a sunscreen. Do not use sun lamps or tanning beds/booths.  What side effects may I notice from receiving this medicine?  Side effects that you should report to your doctor or health care professional as soon as possible:  allergic reactions like skin rash or hives, swelling of the face, lips, or tongue  breathing problems  fever or chills, sore throat  irregular heartbeat, chest pain  joint or muscle pain  pain or difficulty passing urine  red pinpoint spots on skin  redness, blistering, peeling or loosening of the skin, including inside the mouth  unusual bleeding or  bruising  unusually weak or tired  yellowing of the eyes or skin  Side effects that usually do not require medical attention (report to your doctor or health care professional if they continue or are bothersome):  diarrhea  dizziness  headache  loss of appetite  nausea, vomiting  nervousness  This list may not describe all possible side effects. Call your doctor for medical advice about side effects. You may report side effects to FDA at 9-354-VRK-7375.  Where should I keep my medicine?  Keep out of the reach of children.  Store at room temperature between 20 to 25 degrees C (68 to 77 degrees F). Protect from light. Throw away any unused medicine after the expiration date.  NOTE: This sheet is a summary. It may not cover all possible information. If you have questions about this medicine, talk to your doctor, pharmacist, or health care provider.  © 2020 [employer] (2014-07-25 14:38:26)      Urinary Tract Infection, Adult  A urinary tract infection (UTI) is an infection of any part of the urinary tract. The urinary tract includes:  The kidneys.  The ureters.  The bladder.  The urethra.  These organs make, store, and get rid of pee (urine) in the body.  What are the causes?  This is caused by germs (bacteria) in your genital area. These germs grow and cause swelling (inflammation) of your urinary tract.  What increases the risk?  You are more likely to develop this condition if:  You have a small, thin tube (catheter) to drain pee.  You cannot control when you pee or poop (incontinence).  You are female, and:  You use these methods to prevent pregnancy:  A medicine that kills sperm (spermicide).  A device that blocks sperm (diaphragm).  You have low levels of a female hormone (estrogen).  You are pregnant.  You have genes that add to your risk.  You are sexually active.  You take antibiotic medicines.  You have trouble peeing because of:  A prostate that is bigger than normal, if you are male.  A blockage  in the part of your body that drains pee from the bladder (urethra).  A kidney stone.  A nerve condition that affects your bladder (neurogenic bladder).  Not getting enough to drink.  Not peeing often enough.  You have other conditions, such as:  Diabetes.  A weak disease-fighting system (immune system).  Sickle cell disease.  Gout.  Injury of the spine.  What are the signs or symptoms?  Symptoms of this condition include:  Needing to pee right away (urgently).  Peeing often.  Peeing small amounts often.  Pain or burning when peeing.  Blood in the pee.  Pee that smells bad or not like normal.  Trouble peeing.  Pee that is cloudy.  Fluid coming from the vagina, if you are female.  Pain in the belly or lower back.  Other symptoms include:  Throwing up (vomiting).  No urge to eat.  Feeling mixed up (confused).  Being tired and grouchy (irritable).  A fever.  Watery poop (diarrhea).  How is this treated?  This condition may be treated with:  Antibiotic medicine.  Other medicines.  Drinking enough water.  Follow these instructions at home:    Medicines  Take over-the-counter and prescription medicines only as told by your doctor.  If you were prescribed an antibiotic medicine, take it as told by your doctor. Do not stop taking it even if you start to feel better.  General instructions  Make sure you:  Pee until your bladder is empty.  Do not hold pee for a long time.  Empty your bladder after sex.  Wipe from front to back after pooping if you are a female. Use each tissue one time when you wipe.  Drink enough fluid to keep your pee pale yellow.  Keep all follow-up visits as told by your doctor. This is important.  Contact a doctor if:  You do not get better after 1-2 days.  Your symptoms go away and then come back.  Get help right away if:  You have very bad back pain.  You have very bad pain in your lower belly.  You have a fever.  You are sick to your stomach (nauseous).  You are throwing up.  Summary  A urinary tract  infection (UTI) is an infection of any part of the urinary tract.  This condition is caused by germs in your genital area.  There are many risk factors for a UTI. These include having a small, thin tube to drain pee and not being able to control when you pee or poop.  Treatment includes antibiotic medicines for germs.  Drink enough fluid to keep your pee pale yellow.  This information is not intended to replace advice given to you by your health care provider. Make sure you discuss any questions you have with your health care provider.  Document Released: 06/05/2009 Document Revised: 12/05/2019 Document Reviewed: 06/27/2019  [employer] Patient Education © 2020 [employer]

## 2022-05-14 LAB
BACTERIA UR CULT: NORMAL
SIGNIFICANT IND 70042: NORMAL
SITE SITE: NORMAL
SOURCE SOURCE: NORMAL

## 2022-05-16 LAB
BACTERIA BLD CULT: NORMAL
BACTERIA BLD CULT: NORMAL
SIGNIFICANT IND 70042: NORMAL
SIGNIFICANT IND 70042: NORMAL
SITE SITE: NORMAL
SITE SITE: NORMAL
SOURCE SOURCE: NORMAL
SOURCE SOURCE: NORMAL

## 2023-05-07 ENCOUNTER — APPOINTMENT (OUTPATIENT)
Dept: RADIOLOGY | Facility: MEDICAL CENTER | Age: 48
End: 2023-05-07
Attending: EMERGENCY MEDICINE
Payer: COMMERCIAL

## 2023-05-07 ENCOUNTER — HOSPITAL ENCOUNTER (EMERGENCY)
Facility: MEDICAL CENTER | Age: 48
End: 2023-05-07
Attending: EMERGENCY MEDICINE
Payer: COMMERCIAL

## 2023-05-07 VITALS
RESPIRATION RATE: 13 BRPM | DIASTOLIC BLOOD PRESSURE: 71 MMHG | HEART RATE: 69 BPM | OXYGEN SATURATION: 92 % | WEIGHT: 197.75 LBS | SYSTOLIC BLOOD PRESSURE: 120 MMHG | HEIGHT: 63 IN | TEMPERATURE: 98 F | BODY MASS INDEX: 35.04 KG/M2

## 2023-05-07 DIAGNOSIS — R10.13 EPIGASTRIC PAIN: ICD-10-CM

## 2023-05-07 DIAGNOSIS — K59.00 CONSTIPATION, UNSPECIFIED CONSTIPATION TYPE: ICD-10-CM

## 2023-05-07 LAB
ALBUMIN SERPL BCP-MCNC: 4.3 G/DL (ref 3.2–4.9)
ALBUMIN/GLOB SERPL: 1.4 G/DL
ALP SERPL-CCNC: 81 U/L (ref 30–99)
ALT SERPL-CCNC: 15 U/L (ref 2–50)
ANION GAP SERPL CALC-SCNC: 10 MMOL/L (ref 7–16)
APPEARANCE UR: CLEAR
AST SERPL-CCNC: 16 U/L (ref 12–45)
BASOPHILS # BLD AUTO: 0.6 % (ref 0–1.8)
BASOPHILS # BLD: 0.05 K/UL (ref 0–0.12)
BILIRUB SERPL-MCNC: 0.4 MG/DL (ref 0.1–1.5)
BILIRUB UR QL STRIP.AUTO: NEGATIVE
BUN SERPL-MCNC: 13 MG/DL (ref 8–22)
CALCIUM ALBUM COR SERPL-MCNC: 8.7 MG/DL (ref 8.5–10.5)
CALCIUM SERPL-MCNC: 8.9 MG/DL (ref 8.4–10.2)
CHLORIDE SERPL-SCNC: 100 MMOL/L (ref 96–112)
CO2 SERPL-SCNC: 25 MMOL/L (ref 20–33)
COLOR UR: YELLOW
CREAT SERPL-MCNC: 0.82 MG/DL (ref 0.5–1.4)
EKG IMPRESSION: NORMAL
EOSINOPHIL # BLD AUTO: 0.12 K/UL (ref 0–0.51)
EOSINOPHIL NFR BLD: 1.5 % (ref 0–6.9)
ERYTHROCYTE [DISTWIDTH] IN BLOOD BY AUTOMATED COUNT: 42 FL (ref 35.9–50)
FLUAV RNA SPEC QL NAA+PROBE: NEGATIVE
FLUBV RNA SPEC QL NAA+PROBE: NEGATIVE
GFR SERPLBLD CREATININE-BSD FMLA CKD-EPI: 89 ML/MIN/1.73 M 2
GLOBULIN SER CALC-MCNC: 3 G/DL (ref 1.9–3.5)
GLUCOSE SERPL-MCNC: 92 MG/DL (ref 65–99)
GLUCOSE UR STRIP.AUTO-MCNC: NEGATIVE MG/DL
HCG SERPL QL: NEGATIVE
HCT VFR BLD AUTO: 42.2 % (ref 37–47)
HGB BLD-MCNC: 14.1 G/DL (ref 12–16)
IMM GRANULOCYTES # BLD AUTO: 0.02 K/UL (ref 0–0.11)
IMM GRANULOCYTES NFR BLD AUTO: 0.3 % (ref 0–0.9)
KETONES UR STRIP.AUTO-MCNC: NEGATIVE MG/DL
LEUKOCYTE ESTERASE UR QL STRIP.AUTO: NEGATIVE
LIPASE SERPL-CCNC: 17 U/L (ref 7–58)
LYMPHOCYTES # BLD AUTO: 3.32 K/UL (ref 1–4.8)
LYMPHOCYTES NFR BLD: 41.8 % (ref 22–41)
MCH RBC QN AUTO: 29.7 PG (ref 27–33)
MCHC RBC AUTO-ENTMCNC: 33.4 G/DL (ref 33.6–35)
MCV RBC AUTO: 89 FL (ref 81.4–97.8)
MICRO URNS: NORMAL
MONOCYTES # BLD AUTO: 0.56 K/UL (ref 0–0.85)
MONOCYTES NFR BLD AUTO: 7 % (ref 0–13.4)
NEUTROPHILS # BLD AUTO: 3.88 K/UL (ref 2–7.15)
NEUTROPHILS NFR BLD: 48.8 % (ref 44–72)
NITRITE UR QL STRIP.AUTO: NEGATIVE
NRBC # BLD AUTO: 0 K/UL
NRBC BLD-RTO: 0 /100 WBC
PH UR STRIP.AUTO: 6 [PH] (ref 5–8)
PLATELET # BLD AUTO: 246 K/UL (ref 164–446)
PMV BLD AUTO: 8.8 FL (ref 9–12.9)
POTASSIUM SERPL-SCNC: 4.1 MMOL/L (ref 3.6–5.5)
PROT SERPL-MCNC: 7.3 G/DL (ref 6–8.2)
PROT UR QL STRIP: NEGATIVE MG/DL
RBC # BLD AUTO: 4.74 M/UL (ref 4.2–5.4)
RBC UR QL AUTO: NEGATIVE
RSV RNA SPEC QL NAA+PROBE: NEGATIVE
SARS-COV-2 RNA RESP QL NAA+PROBE: NOTDETECTED
SODIUM SERPL-SCNC: 135 MMOL/L (ref 135–145)
SP GR UR STRIP.AUTO: <=1.005
SPECIMEN SOURCE: NORMAL
TROPONIN T SERPL-MCNC: <6 NG/L (ref 6–19)
WBC # BLD AUTO: 8 K/UL (ref 4.8–10.8)

## 2023-05-07 PROCEDURE — 83690 ASSAY OF LIPASE: CPT

## 2023-05-07 PROCEDURE — 80053 COMPREHEN METABOLIC PANEL: CPT

## 2023-05-07 PROCEDURE — 700117 HCHG RX CONTRAST REV CODE 255: Performed by: EMERGENCY MEDICINE

## 2023-05-07 PROCEDURE — A9270 NON-COVERED ITEM OR SERVICE: HCPCS | Performed by: EMERGENCY MEDICINE

## 2023-05-07 PROCEDURE — 74177 CT ABD & PELVIS W/CONTRAST: CPT

## 2023-05-07 PROCEDURE — 84484 ASSAY OF TROPONIN QUANT: CPT

## 2023-05-07 PROCEDURE — 71045 X-RAY EXAM CHEST 1 VIEW: CPT

## 2023-05-07 PROCEDURE — 93005 ELECTROCARDIOGRAM TRACING: CPT | Performed by: EMERGENCY MEDICINE

## 2023-05-07 PROCEDURE — 36415 COLL VENOUS BLD VENIPUNCTURE: CPT

## 2023-05-07 PROCEDURE — 85025 COMPLETE CBC W/AUTO DIFF WBC: CPT

## 2023-05-07 PROCEDURE — 0241U HCHG SARS-COV-2 COVID-19 NFCT DS RESP RNA 4 TRGT MIC: CPT

## 2023-05-07 PROCEDURE — 700102 HCHG RX REV CODE 250 W/ 637 OVERRIDE(OP): Performed by: EMERGENCY MEDICINE

## 2023-05-07 PROCEDURE — 84703 CHORIONIC GONADOTROPIN ASSAY: CPT

## 2023-05-07 PROCEDURE — 81003 URINALYSIS AUTO W/O SCOPE: CPT

## 2023-05-07 PROCEDURE — 99285 EMERGENCY DEPT VISIT HI MDM: CPT

## 2023-05-07 PROCEDURE — C9803 HOPD COVID-19 SPEC COLLECT: HCPCS | Performed by: EMERGENCY MEDICINE

## 2023-05-07 RX ORDER — OMEPRAZOLE 40 MG/1
40 CAPSULE, DELAYED RELEASE ORAL PRN
Status: SHIPPED | COMMUNITY
End: 2024-02-15

## 2023-05-07 RX ORDER — METHADONE HYDROCHLORIDE 10 MG/ML
30 CONCENTRATE ORAL DAILY
COMMUNITY

## 2023-05-07 RX ORDER — POLYETHYLENE GLYCOL 3350 17 G/17G
17 POWDER, FOR SOLUTION ORAL DAILY
Qty: 225 G | Refills: 0 | Status: SHIPPED | OUTPATIENT
Start: 2023-05-07 | End: 2024-02-15

## 2023-05-07 RX ADMIN — LIDOCAINE HYDROCHLORIDE 30 ML: 20 SOLUTION OROPHARYNGEAL at 12:28

## 2023-05-07 RX ADMIN — IOHEXOL 100 ML: 350 INJECTION, SOLUTION INTRAVENOUS at 13:24

## 2023-05-07 ASSESSMENT — FIBROSIS 4 INDEX: FIB4 SCORE: 1.09

## 2023-05-07 NOTE — ED NOTES
Med rec updated and complete, per pt   Allergies reviewed, per pt  Unable to verify METHADONE dose from [facility] ([phone]), they are closed today

## 2023-05-07 NOTE — ED PROVIDER NOTES
ED PHYSICIAN NOTE    CHIEF COMPLAINT  Chief Complaint   Patient presents with    Abdominal Pain     Cramppy diffuse abd pain x 1 wk  Constipated LNBM 3 d ago Hx chronic constipation and followed by GI    Rectal Bleeding     Onset Fri pm  Has urge to have BM but passes only bright red blood x 3 episodes  Some dizziness    Shortness of Breath     Onset Thurs Denies CP, fever or cough  Pt fully vaccinated for Covid and flu       EXTERNAL RECORDS REVIEWED  Inpatient Notes patient was admitted with UTI and sepsis about a year ago.    HPI/ROS    Lakesha Fagan is a 47 y.o. female who presents with abdominal pain.  Started about a week ago.  She localizes the pain across her lower abdomen bilaterally and in the central epigastrium.  She feels bloated with cramping discomfort.  Her last bowel movement was 3 days ago.  History of chronic constipation.  She had a colonoscopy about a year ago that was reportedly normal.  2 days ago she had the urge to have a bowel movement and passed some bright red blood.  She has felt dizzy weak.  Felt breathless and anxious.  She has not had a fever, cough congestion runny nose.  No nausea or vomiting.  Denies dysuria hematuria.  No frequency or urgency although she is not sure if the blood was from her urine or in the stool.  No pleuritic pain, leg swelling, leg pain, travel immobilization surgery.    PAST MEDICAL HISTORY  Past Medical History:   Diagnosis Date    Hypertension     Other specified disorder of intestines     Pain        SOCIAL HISTORY  Social History     Tobacco Use    Smoking status: Never    Smokeless tobacco: Never   Vaping Use    Vaping Use: Never used   Substance Use Topics    Alcohol use: No    Drug use: No       CURRENT MEDICATIONS  Home Medications       Reviewed by Lizzy Rubio (Pharmacy Tech) on 05/07/23 at 1314  Med List Status: Complete     Medication Last Dose Status   Acetaminophen (TYLENOL PO) 5/7/2023 Active   busPIRone (BUSPAR) 15  "MG tablet 5/6/2023 Active   hydrOXYzine HCl (ATARAX) 50 MG Tab 5/6/2023 Active   methadone (DOLOPHINE) 10 MG/ML Conc 5/7/2023 Active   omeprazole (PRILOSEC) 40 MG delayed-release capsule  Active   omeprazole (PRILOSEC) 40 MG delayed-release capsule 5/7/2023 Active                    ALLERGIES  No Known Allergies    PHYSICAL EXAM  VITAL SIGNS: /71   Pulse 69   Temp 36.4 °C (97.5 °F) (Temporal)   Resp 13   Ht 1.6 m (5' 3\")   Wt 89.7 kg (197 lb 12 oz)   SpO2 92%   BMI 35.03 kg/m²    Constitutional: Awake and alert  HENT: Normal inspection  Eyes: Normal inspection  Neck: Grossly normal range of motion.  Cardiovascular: Normal heart rate, Normal rhythm.  Symmetric peripheral pulses.   Thorax & Lungs: No respiratory distress, No wheezing, No rales, No rhonchi, No chest tenderness.   Abdomen: Bowel sounds normal, soft, non-distended, tender to palpation central epigastrium without mass.  No tenderness elsewhere throughout.  No rebound or peritonitis.  Rectal: She has external hemorrhoids.  No active bleeding.  Digital rectal exam without fecal impaction.  No bright red blood.  Brown mucus present  Skin: No obvious rash.  Back: No tenderness, No CVA tenderness.   Extremities: No clubbing, cyanosis, edema, no Homans or cords.  Neurologic: Grossly normal   Psychiatric: Normal for situation     DIAGNOSTIC STUDIES / PROCEDURES  LABS/EKG  Results for orders placed or performed during the hospital encounter of 05/07/23   CBC with Differential   Result Value Ref Range    WBC 8.0 4.8 - 10.8 K/uL    RBC 4.74 4.20 - 5.40 M/uL    Hemoglobin 14.1 12.0 - 16.0 g/dL    Hematocrit 42.2 37.0 - 47.0 %    MCV 89.0 81.4 - 97.8 fL    MCH 29.7 27.0 - 33.0 pg    MCHC 33.4 (L) 33.6 - 35.0 g/dL    RDW 42.0 35.9 - 50.0 fL    Platelet Count 246 164 - 446 K/uL    MPV 8.8 (L) 9.0 - 12.9 fL    Neutrophils-Polys 48.80 44.00 - 72.00 %    Lymphocytes 41.80 (H) 22.00 - 41.00 %    Monocytes 7.00 0.00 - 13.40 %    Eosinophils 1.50 0.00 - 6.90 % "    Basophils 0.60 0.00 - 1.80 %    Immature Granulocytes 0.30 0.00 - 0.90 %    Nucleated RBC 0.00 /100 WBC    Neutrophils (Absolute) 3.88 2.00 - 7.15 K/uL    Lymphs (Absolute) 3.32 1.00 - 4.80 K/uL    Monos (Absolute) 0.56 0.00 - 0.85 K/uL    Eos (Absolute) 0.12 0.00 - 0.51 K/uL    Baso (Absolute) 0.05 0.00 - 0.12 K/uL    Immature Granulocytes (abs) 0.02 0.00 - 0.11 K/uL    NRBC (Absolute) 0.00 K/uL   Complete Metabolic Panel (CMP)   Result Value Ref Range    Sodium 135 135 - 145 mmol/L    Potassium 4.1 3.6 - 5.5 mmol/L    Chloride 100 96 - 112 mmol/L    Co2 25 20 - 33 mmol/L    Anion Gap 10.0 7.0 - 16.0    Glucose 92 65 - 99 mg/dL    Bun 13 8 - 22 mg/dL    Creatinine 0.82 0.50 - 1.40 mg/dL    Calcium 8.9 8.4 - 10.2 mg/dL    AST(SGOT) 16 12 - 45 U/L    ALT(SGPT) 15 2 - 50 U/L    Alkaline Phosphatase 81 30 - 99 U/L    Total Bilirubin 0.4 0.1 - 1.5 mg/dL    Albumin 4.3 3.2 - 4.9 g/dL    Total Protein 7.3 6.0 - 8.2 g/dL    Globulin 3.0 1.9 - 3.5 g/dL    A-G Ratio 1.4 g/dL   Troponins NOW   Result Value Ref Range    Troponin T <6 6 - 19 ng/L   HCG Qual Serum   Result Value Ref Range    Beta-Hcg Qualitative Serum Negative Negative   URINALYSIS CULTURE, IF INDICATED    Specimen: Urine, Clean Catch   Result Value Ref Range    Color Yellow     Character Clear     Specific Gravity <=1.005 <1.035    Ph 6.0 5.0 - 8.0    Glucose Negative Negative mg/dL    Ketones Negative Negative mg/dL    Protein Negative Negative mg/dL    Bilirubin Negative Negative    Nitrite Negative Negative    Leukocyte Esterase Negative Negative    Occult Blood Negative Negative    Micro Urine Req see below    LIPASE   Result Value Ref Range    Lipase 17 7 - 58 U/L   CoV-2, FLU A/B, and RSV by PCR (2-4 Hours CEPHEID) : Collect NP swab in VTM    Specimen: Respirate   Result Value Ref Range    Influenza virus A RNA Negative Negative    Influenza virus B, PCR Negative Negative    RSV, PCR Negative Negative    SARS-CoV-2 by PCR NotDetected     SARS-CoV-2  Source NP Swab    CORRECTED CALCIUM   Result Value Ref Range    Correct Calcium 8.7 8.5 - 10.5 mg/dL   ESTIMATED GFR   Result Value Ref Range    GFR (CKD-EPI) 89 >60 mL/min/1.73 m 2   EKG   Result Value Ref Range    Report       [facility] Emergency Dept.    Test Date:  2023  Pt Name:    RIKA HANEY        Department: [facility]  MRN:        [id number]                      Room:       -ROOM 3  Gender:     Female                       Technician: SAIGE  :        1975                   Requested By:ER TRIAGE PROTOCOL  Order #:    [id number]                    Reading MD: OSMANI PORTILLO MD    Measurements  Intervals                                Axis  Rate:       88                           P:          34  CO:         152                          QRS:        -20  QRSD:       90                           T:          42  QT:         378  QTc:        458    Interpretive Statements  Sinus rhythm  Multiple ventricular premature complexes  Borderline left axis deviation  Borderline T abnormalities, anterior leads  Baseline wander in lead(s) V2  Compared to ECG 2021 18:57:24  Ventricular premature complex(es) now present  T-wave abnormalityUnchanged  Electronically Dina d On 2023 13:44:19 PDT by OSMANI PORTILLO MD        I have independently interpreted this EKG  Rhythm strip interpretation-normal sinus rhythm    RADIOLOGY  I have independently interpreted the diagnostic imaging associated with this visit and am waiting the final reading from the radiologist.   My preliminary interpretation is as follows: No free air on CT scan abdomen.  Does appear to have increased stool volume.  No gross colitis  Radiologist interpretation:   CT-ABDOMEN-PELVIS WITH   Final Result      1.  Hepatic steatosis.   2.  Prior cholecystectomy.   3.  No acute inflammatory abnormality. Appendix.         DX-CHEST-PORTABLE (1 VIEW)   Final Result      Negative single view of the chest.             COURSE & MEDICAL DECISION MAKING    ED Observation Status? Yes; I am placing the patient in to an observation status due to a diagnostic uncertainty as well as therapeutic intensity. Patient placed in observation status at 1150PM, 5/7/2023.     Observation plan is as follows: Obtain diagnostic testing including imaging and laboratory data.  Treat symptoms.  Monitor for improvement.  Monitor vital signs and telemetry.    Upon Reevaluation, the patient's condition has: Improved; and will be discharged.    Patient discharged from ED Observation status at 2:22 PM 5/7/2023    INITIAL ASSESSMENT, COURSE AND PLAN  Care Narrative: Patient presents with complaints as above. Presentation is complex and multiple life-threatening conditions are considered.  Patient requires emergency diagnostic testing.  Will need to evaluate for gastrointestinal hemorrhage, diverticulitis, constipation, colitis, pancreatitis, biliary obstruction, bowel obstruction, mesenteric ischemia, ACS, pneumonia etc.  Work-up is initiated.  She was given a GI cocktail.    EKG does not show acute ischemic changes.    Laboratory data began to return.  This all returned reassuring.  There is no anemia.  No elevated BUN.  Does not have any electrolyte disturbance, renal dysfunction liver dysfunction to suggest obstruction.  She does not have pancreatitis.  Has had constant symptoms for several days with negative troponin ruling out acute coronary syndrome.    CT scan of the abdomen and pelvis was negative for acute inflammatory process.  She does not have AAA appendicitis, colitis or other emergency process.  Chest x-ray was negative.    At this point I suspect most likely symptoms related to constipation.  With reported hematochezia she may have had a ruptured hemorrhoid I do not see any active bleeding at this time.  She is quite anxious and this could explain her breathlessness.  I will treat constipation with MiraLAX.  Advised stool bulking plenty  of activity and fluids.  She was bit given precautions to return to the ER for any fever, change in character pain, not improving or concern.        ADDITIONAL PROBLEM LIST  Opiate dependence-likely contributing to constipation    DISPOSITION AND DISCUSSIONS    Escalation of care considered, and ultimately not performed:acute inpatient care management, however at this time, the patient is most appropriate for outpatient management    Decision tools: Risk heart score.    Prescription drugs considered and/or prescribed: Prescribed MiraLAX.    FINAL IMPRESSION  1.  Abdominal pain  2.  Hematochezia  3.  Constipation  4.  Dyspnea    This dictation was created using voice recognition software. The accuracy of the dictation is limited to the abilities of the software. I expect there may be some errors of grammar and possibly content. The nursing notes were reviewed and certain aspects of this information were incorporated into this note.    Electronically signed by: John Li M.D., 5/7/2023

## 2024-02-15 ENCOUNTER — OFFICE VISIT (OUTPATIENT)
Dept: MEDICAL GROUP | Facility: MEDICAL CENTER | Age: 49
End: 2024-02-15
Payer: COMMERCIAL

## 2024-02-15 VITALS
TEMPERATURE: 97.6 F | SYSTOLIC BLOOD PRESSURE: 122 MMHG | WEIGHT: 186 LBS | OXYGEN SATURATION: 94 % | RESPIRATION RATE: 16 BRPM | BODY MASS INDEX: 32.96 KG/M2 | HEART RATE: 68 BPM | DIASTOLIC BLOOD PRESSURE: 72 MMHG | HEIGHT: 63 IN

## 2024-02-15 DIAGNOSIS — K59.09 CHRONIC CONSTIPATION: ICD-10-CM

## 2024-02-15 DIAGNOSIS — Z11.4 SCREENING FOR HIV (HUMAN IMMUNODEFICIENCY VIRUS): ICD-10-CM

## 2024-02-15 DIAGNOSIS — Z01.84 IMMUNITY STATUS TESTING: ICD-10-CM

## 2024-02-15 DIAGNOSIS — R23.2 HOT FLASHES: ICD-10-CM

## 2024-02-15 DIAGNOSIS — E66.9 OBESITY (BMI 30-39.9): ICD-10-CM

## 2024-02-15 DIAGNOSIS — Z11.59 ENCOUNTER FOR HEPATITIS C SCREENING TEST FOR LOW RISK PATIENT: ICD-10-CM

## 2024-02-15 DIAGNOSIS — Z76.89 ENCOUNTER TO ESTABLISH CARE: ICD-10-CM

## 2024-02-15 DIAGNOSIS — R73.01 IFG (IMPAIRED FASTING GLUCOSE): ICD-10-CM

## 2024-02-15 DIAGNOSIS — E55.9 VITAMIN D DEFICIENCY: ICD-10-CM

## 2024-02-15 DIAGNOSIS — N95.1 MENOPAUSAL SYMPTOMS: ICD-10-CM

## 2024-02-15 DIAGNOSIS — E78.5 DYSLIPIDEMIA: ICD-10-CM

## 2024-02-15 PROBLEM — K76.0 HEPATIC STEATOSIS: Status: ACTIVE | Noted: 2024-02-15

## 2024-02-15 PROBLEM — N39.0 UTI (URINARY TRACT INFECTION): Status: RESOLVED | Noted: 2022-05-11 | Resolved: 2024-02-15

## 2024-02-15 PROCEDURE — 3074F SYST BP LT 130 MM HG: CPT | Performed by: NURSE PRACTITIONER

## 2024-02-15 PROCEDURE — 3078F DIAST BP <80 MM HG: CPT | Performed by: NURSE PRACTITIONER

## 2024-02-15 PROCEDURE — 99204 OFFICE O/P NEW MOD 45 MIN: CPT | Performed by: NURSE PRACTITIONER

## 2024-02-15 RX ORDER — VENLAFAXINE HYDROCHLORIDE 37.5 MG/1
CAPSULE, EXTENDED RELEASE ORAL
COMMUNITY
Start: 2023-12-13 | End: 2024-03-15

## 2024-02-15 RX ORDER — FEZOLINETANT 45 MG/1
1 TABLET, FILM COATED ORAL DAILY
Qty: 90 TABLET | Refills: 3 | Status: SHIPPED | OUTPATIENT
Start: 2024-02-15

## 2024-02-15 RX ORDER — LINACLOTIDE 290 UG/1
CAPSULE, GELATIN COATED ORAL
COMMUNITY
Start: 2024-02-06

## 2024-02-15 ASSESSMENT — FIBROSIS 4 INDEX: FIB4 SCORE: 0.79

## 2024-02-15 ASSESSMENT — PATIENT HEALTH QUESTIONNAIRE - PHQ9
5. POOR APPETITE OR OVEREATING: 0 - NOT AT ALL
SUM OF ALL RESPONSES TO PHQ QUESTIONS 1-9: 5
CLINICAL INTERPRETATION OF PHQ2 SCORE: 2

## 2024-02-15 NOTE — LETTER
[facility]  BERNADINE HinsonPDeloresRDeloresN.  [address]  [place] [address]  Fax: [phone]   Authorization for Release/Disclosure of   Protected Health Information   Name: RIKA HANEY : 1975 SSN: [id number]   Address: [address] [place] [address]  [place] [address] Phone:    [phone] (home)    I authorize the entity listed below to release/disclose the PHI below to:   [facility]/TAMAR Hinson.P.R.PEDRO and NOREEN HinsonRSTEVE   Provider or Entity Name:  [place] [facility]   Address   City, State, [facility]   Phone:      Fax:     Reason for request: continuity of care   Information to be released:    [  ] LAST COLONOSCOPY,  including any PATH REPORT and follow-up  [  ] LAST FIT/COLOGUARD RESULT [  ] LAST DEXA  [  ] LAST MAMMOGRAM  [  x] LAST PAP  [  ] LAST LABS [  ] RETINA EXAM REPORT  [  ] IMMUNIZATION RECORDS  [  ] Release all info      [  ] Check here and initial the line next to each item to release ALL health information INCLUDING  _____ Care and treatment for drug and / or alcohol abuse  _____ HIV testing, infection status, or AIDS  _____ Genetic Testing    DATES OF SERVICE OR TIME PERIOD TO BE DISCLOSED: _____________  I understand and acknowledge that:  * This Authorization may be revoked at any time by you in writing, except if your health information has already been used or disclosed.  * Your health information that will be used or disclosed as a result of you signing this authorization could be re-disclosed by the recipient. If this occurs, your re-disclosed health information may no longer be protected by State or Federal laws.  * You may refuse to sign this Authorization. Your refusal will not affect your ability to obtain treatment.  * This Authorization becomes effective upon signing and will  on (date) __________.      If no date is indicated, this Authorization will  one (1) year from the signature date.    Name: Rika Angel  Choco Fagan  Signature: Date:   2/15/2024     PLEASE FAX REQUESTED RECORDS BACK TO: [phone]

## 2024-02-15 NOTE — LETTER
[facility]  BERNADINE HinsonPDeloresRDeloresN.  [address] [facility] [address]  [place] [address]  Fax: [phone]   Authorization for Release/Disclosure of   Protected Health Information   Name: RIKA HANEY : 1975 SSN: [id number]   Address: [address] [place] [address]  [place] [address] Phone:    [phone] (home)    I authorize the entity listed below to release/disclose the PHI below to:   [facility]/TAMAR Hinson.P.R.PEDRO and NOREEN HinsonRSTEVE   Provider or Entity Name:  [facility]   Address   City, State, [facility]   Phone:      Fax:     Reason for request: continuity of care   Information to be released:    [ x ] LAST COLONOSCOPY,  including any PATH REPORT and follow-up  [x  ] LAST FIT/COLOGUARD RESULT [  ] LAST DEXA  [  ] LAST MAMMOGRAM  [  ] LAST PAP  [  ] LAST LABS [  ] RETINA EXAM REPORT  [  ] IMMUNIZATION RECORDS  [  ] Release all info      [  ] Check here and initial the line next to each item to release ALL health information INCLUDING  _____ Care and treatment for drug and / or alcohol abuse  _____ HIV testing, infection status, or AIDS  _____ Genetic Testing    DATES OF SERVICE OR TIME PERIOD TO BE DISCLOSED: _____________  I understand and acknowledge that:  * This Authorization may be revoked at any time by you in writing, except if your health information has already been used or disclosed.  * Your health information that will be used or disclosed as a result of you signing this authorization could be re-disclosed by the recipient. If this occurs, your re-disclosed health information may no longer be protected by State or Federal laws.  * You may refuse to sign this Authorization. Your refusal will not affect your ability to obtain treatment.  * This Authorization becomes effective upon signing and will  on (date) __________.      If no date is indicated, this Authorization will  one (1) year from the signature date.    Name: Rika Wilhelm  Rylan  Signature: Date:   2/15/2024     PLEASE FAX REQUESTED RECORDS BACK TO: [phone]

## 2024-02-15 NOTE — PROGRESS NOTES
HPI    Lakesha Fagan is a 48 y.o. female here to Establish Care and   Chief Complaint   Patient presents with    Establish Care      Previous PCP : Silva Ellis APRN    [facility]   Having menopausal symptoms.   Stress/anxiety, fatigue not so much fatigue, having hot flashes, night sweats/.   -started Venlafaxine 75mg 9/2023 for menopausal symptoms.   Started on Vehoza recently- 3 weeks ago-was given samples but only taking 1 tablet every few days.  -will request records.     Has hemorrhoids.   -recurrent issue.   -worse in the last 6 months.   -using preparation H.    Chronic Constipation.  Taking Linzess PRN-has not taken in quite some time.  Taking Magnesium instead-this has been helping a little.  -would recommend restarting back on Linzess.    Had Colonoscopy 2 years ago-will request recors.     Weaning off Methadone-hopefully to be off by April.  Hx of back surgery/pain.  [facility] manages this.     Taking hydroxyzine 50 mg once a day for anxiety.    Chart reviewed        ROS: SEE ABOVE        Current Outpatient Medications:     venlafaxine XR (EFFEXOR XR) 37.5 MG CAPSULE SR 24 HR, TAKE 1 CAPSULE WITH FOOD ORALLY ONCE A DAY FOR 7 DAYS THEN INCREASE TO 2 TIMES A DAY 30 DAYS, Disp: , Rfl:     LINZESS 290 MCG Cap, TAKE 290 MCG CAPSULE BY MOUTH ONCE A DAY FOR IRRITABLE BOWEL SYNDROME WITH CONSTIPATION, Disp: , Rfl:     Fezolinetant (VEOZAH) 45 MG Tab, Take 1 Tablet by mouth every day., Disp: 90 Tablet, Rfl: 3    methadone (DOLOPHINE) 10 MG/ML Conc, Take 30 mg by mouth every day. [phone] to [facility] dose, they are closed today (5/7/2023), Disp: , Rfl:     hydrOXYzine HCl (ATARAX) 50 MG Tab, Take 50 mg by mouth 3 times a day as needed for Anxiety., Disp: , Rfl: 3    No Known Allergies    Past Medical History:   Diagnosis Date    Hypertension     Other specified disorder of intestines      Past Surgical History:   Procedure Laterality Date     "FUSION, SPINE, LUMBAR, PLIF  3/13/2013    Performed by Arleth Garcia M.D. at SURGERY [facility]    LUMBAR LAMINECTOMY DISKECTOMY  3/13/2013    Performed by Arleth Garcia M.D. at SURGERY [facility]    LUMBAR DECOMPRESSION  3/13/2013    Performed by Arleth Garcia M.D. at SURGERY [facility]    CHOLECYSTECTOMY       History reviewed. No pertinent family history.  Social History     Socioeconomic History    Marital status:      Spouse name: Not on file    Number of children: Not on file    Years of education: Not on file    Highest education level: Not on file   Occupational History    Not on file   Tobacco Use    Smoking status: Never    Smokeless tobacco: Never   Vaping Use    Vaping Use: Never used   Substance and Sexual Activity    Alcohol use: No    Drug use: No    Sexual activity: Not on file   Other Topics Concern    Not on file   Social History Narrative    Not on file     Social Determinants of Health     Financial Resource Strain: Not on file   Food Insecurity: Not on file   Transportation Needs: Not on file   Physical Activity: Not on file   Stress: Not on file   Social Connections: Not on file   Intimate Partner Violence: Not on file   Housing Stability: Not on file       Objective:     Vitals: /72   Pulse 68   Temp 36.4 °C (97.6 °F)   Resp 16   Ht 1.6 m (5' 3\")   Wt 84.4 kg (186 lb)   SpO2 94%   BMI 32.95 kg/m²      General: Alert, pleasant, NAD  HEENT:  Normocephalic.  Neck supple.  No thyromegaly or masses palpated. No cervical or supraclavicular lymphadenopathy.  Heart:  Regular rate and rhythm.  S1 and S2 normal.  No murmurs appreciated.    Respiratory:  Normal respiratory effort.  Clear to auscultation bilaterally.    Skin:  Warm, dry, no rashes  Musculoskeletal:  Gait is normal.  Moves all extremities well.  Extremities:   No leg edema.  Neurological: No tremors  Psych:  Affect/mood is normal, judgement is good, memory is intact, grooming is " appropriate.    Assessment and Plan.     48 y.o. female to establish care and discuss the followin. Menopausal symptoms  2. Hot flashes  Ongoing problem.  Not well-controlled.  Mostly struggling with the hot flashes during the day as this is disrupting her ADLs as well as her social interactions.   recommend continuing the venlafaxine, recommend taking the Vehoza  on a daily basis to see if this will be more effective and therapeutic for her.  We can consider gabapentin.  Consider acupuncture, nonhormonal treatment options however if ineffective we will discuss starting on hormonal therapy.  Prior Auth started for Vehoza  - CBC WITH DIFFERENTIAL; Future  - VITAMIN B12; Future  - Fezolinetant (VEOZAH) 45 MG Tab; Take 1 Tablet by mouth every day.  Dispense: 90 Tablet; Refill: 3    3. Chronic constipation  Chronic.  Recommend restarting Linzess.    4. Obesity (BMI 30-39.9)  - TSH; Future  - Comp Metabolic Panel; Future  - Patient identified as having weight management issue.  Appropriate orders and counseling given.  - HEMOGLOBIN A1C; Future    5. Vitamin D deficiency  Historically present based on chart review.  Update labs.  - VITAMIN D,25 HYDROXY (DEFICIENCY); Future    6. Encounter for hepatitis C screening test for low risk patient  - HEP C VIRUS ANTIBODY; Future    7. Screening for HIV (human immunodeficiency virus)  - HIV AG/AB COMBO ASSAY SCREENING; Future    8. Dyslipidemia  Historically present.  Not on statin therapy.  Update lipid panel.  Evaluate ASCVD risk.  - Lipid Profile; Future    9. Immunity status testing  - HEP B SURFACE AB; Future    10. Encounter to establish care    11. IFG (impaired fasting glucose)  - HEMOGLOBIN A1C; Future        Return in about 4 weeks (around 3/14/2024) for Lab results.      Monae CUELLO

## 2024-03-01 LAB
25(OH)D3+25(OH)D2 SERPL-MCNC: 27.1 NG/ML (ref 30–100)
ALBUMIN SERPL-MCNC: 4.5 G/DL (ref 3.9–4.9)
ALBUMIN/GLOB SERPL: 1.4 {RATIO} (ref 1.2–2.2)
ALP SERPL-CCNC: 112 IU/L (ref 44–121)
ALT SERPL-CCNC: 16 IU/L (ref 0–32)
AST SERPL-CCNC: 18 IU/L (ref 0–40)
BASOPHILS # BLD AUTO: 0 X10E3/UL (ref 0–0.2)
BASOPHILS NFR BLD AUTO: 1 %
BILIRUB SERPL-MCNC: 0.4 MG/DL (ref 0–1.2)
BUN SERPL-MCNC: 14 MG/DL (ref 6–24)
BUN/CREAT SERPL: 18 (ref 9–23)
CALCIUM SERPL-MCNC: 9.7 MG/DL (ref 8.7–10.2)
CHLORIDE SERPL-SCNC: 102 MMOL/L (ref 96–106)
CHOLEST SERPL-MCNC: 205 MG/DL (ref 100–199)
CO2 SERPL-SCNC: 25 MMOL/L (ref 20–29)
CREAT SERPL-MCNC: 0.79 MG/DL (ref 0.57–1)
EGFRCR SERPLBLD CKD-EPI 2021: 93 ML/MIN/1.73
EOSINOPHIL # BLD AUTO: 0.1 X10E3/UL (ref 0–0.4)
EOSINOPHIL NFR BLD AUTO: 2 %
ERYTHROCYTE [DISTWIDTH] IN BLOOD BY AUTOMATED COUNT: 13.6 % (ref 11.7–15.4)
GLOBULIN SER CALC-MCNC: 3.2 G/DL (ref 1.5–4.5)
GLUCOSE SERPL-MCNC: 94 MG/DL (ref 70–99)
HBA1C MFR BLD: 6 % (ref 4.8–5.6)
HBV SURFACE AB SER QL: REACTIVE
HCT VFR BLD AUTO: 42.3 % (ref 34–46.6)
HCV IGG SERPL QL IA: NON REACTIVE
HDLC SERPL-MCNC: 46 MG/DL
HGB BLD-MCNC: 14 G/DL (ref 11.1–15.9)
HIV 1+2 AB+HIV1 P24 AG SERPL QL IA: NON REACTIVE
IMM GRANULOCYTES # BLD AUTO: 0 X10E3/UL (ref 0–0.1)
IMM GRANULOCYTES NFR BLD AUTO: 0 %
IMMATURE CELLS  115398: NORMAL
LABORATORY COMMENT REPORT: ABNORMAL
LDLC SERPL CALC-MCNC: 133 MG/DL (ref 0–99)
LYMPHOCYTES # BLD AUTO: 2.6 X10E3/UL (ref 0.7–3.1)
LYMPHOCYTES NFR BLD AUTO: 43 %
MCH RBC QN AUTO: 30.2 PG (ref 26.6–33)
MCHC RBC AUTO-ENTMCNC: 33.1 G/DL (ref 31.5–35.7)
MCV RBC AUTO: 91 FL (ref 79–97)
MONOCYTES # BLD AUTO: 0.4 X10E3/UL (ref 0.1–0.9)
MONOCYTES NFR BLD AUTO: 6 %
MORPHOLOGY BLD-IMP: NORMAL
NEUTROPHILS # BLD AUTO: 2.9 X10E3/UL (ref 1.4–7)
NEUTROPHILS NFR BLD AUTO: 48 %
NRBC BLD AUTO-RTO: NORMAL %
PLATELET # BLD AUTO: 264 X10E3/UL (ref 150–450)
POTASSIUM SERPL-SCNC: 4.6 MMOL/L (ref 3.5–5.2)
PROT SERPL-MCNC: 7.7 G/DL (ref 6–8.5)
RBC # BLD AUTO: 4.63 X10E6/UL (ref 3.77–5.28)
SODIUM SERPL-SCNC: 140 MMOL/L (ref 134–144)
TRIGL SERPL-MCNC: 148 MG/DL (ref 0–149)
TSH SERPL DL<=0.005 MIU/L-ACNC: 4.2 UIU/ML (ref 0.45–4.5)
VIT B12 SERPL-MCNC: 357 PG/ML (ref 232–1245)
VLDLC SERPL CALC-MCNC: 26 MG/DL (ref 5–40)
WBC # BLD AUTO: 6.1 X10E3/UL (ref 3.4–10.8)

## 2024-03-15 ENCOUNTER — OFFICE VISIT (OUTPATIENT)
Dept: MEDICAL GROUP | Facility: MEDICAL CENTER | Age: 49
End: 2024-03-15
Payer: COMMERCIAL

## 2024-03-15 VITALS
OXYGEN SATURATION: 97 % | DIASTOLIC BLOOD PRESSURE: 70 MMHG | HEIGHT: 63 IN | SYSTOLIC BLOOD PRESSURE: 124 MMHG | RESPIRATION RATE: 16 BRPM | TEMPERATURE: 97.6 F | BODY MASS INDEX: 34.02 KG/M2 | WEIGHT: 192 LBS | HEART RATE: 100 BPM

## 2024-03-15 DIAGNOSIS — N95.1 PERIMENOPAUSAL VASOMOTOR SYMPTOMS: ICD-10-CM

## 2024-03-15 DIAGNOSIS — R23.2 HOT FLASHES: ICD-10-CM

## 2024-03-15 DIAGNOSIS — R73.03 PREDIABETES: ICD-10-CM

## 2024-03-15 DIAGNOSIS — E53.8 VITAMIN B 12 DEFICIENCY: ICD-10-CM

## 2024-03-15 DIAGNOSIS — E78.5 DYSLIPIDEMIA: ICD-10-CM

## 2024-03-15 DIAGNOSIS — F41.9 ANXIETY: ICD-10-CM

## 2024-03-15 DIAGNOSIS — E55.9 VITAMIN D DEFICIENCY: ICD-10-CM

## 2024-03-15 PROCEDURE — 99214 OFFICE O/P EST MOD 30 MIN: CPT | Performed by: NURSE PRACTITIONER

## 2024-03-15 PROCEDURE — 3074F SYST BP LT 130 MM HG: CPT | Performed by: NURSE PRACTITIONER

## 2024-03-15 PROCEDURE — 3078F DIAST BP <80 MM HG: CPT | Performed by: NURSE PRACTITIONER

## 2024-03-15 RX ORDER — VENLAFAXINE HYDROCHLORIDE 37.5 MG/1
CAPSULE, EXTENDED RELEASE ORAL
Qty: 90 CAPSULE | Refills: 0 | Status: SHIPPED | OUTPATIENT
Start: 2024-03-15

## 2024-03-15 RX ORDER — FLUOXETINE 10 MG/1
CAPSULE ORAL
Qty: 90 CAPSULE | Refills: 0 | Status: SHIPPED | OUTPATIENT
Start: 2024-03-15

## 2024-03-15 ASSESSMENT — FIBROSIS 4 INDEX: FIB4 SCORE: 0.8

## 2024-03-15 NOTE — PROGRESS NOTES
"Subjective:     HPI:     Lakesha Fagan is a 48 y.o. female  presents to discuss:   Chief Complaint   Patient presents with    Follow-Up     Labs reviewed    ROS: : see above      Current Outpatient Medications:     venlafaxine XR (EFFEXOR XR) 37.5 MG CAPSULE SR 24 HR, Take 2 capsule daily by mouth for 4 weeks then take 1 capsule daily thereafter for 2-3 weeks then stop, Disp: 90 Capsule, Rfl: 0    FLUoxetine (PROZAC) 10 MG Cap, Take one capsule by mouth daily., Disp: 90 Capsule, Rfl: 0    Drospirenone-Estradiol 0.25-0.5 MG Tab, Take 1 Tablet by mouth every day., Disp: 90 Tablet, Rfl: 3    LINZESS 290 MCG Cap, TAKE 290 MCG CAPSULE BY MOUTH ONCE A DAY FOR IRRITABLE BOWEL SYNDROME WITH CONSTIPATION, Disp: , Rfl:     Fezolinetant (VEOZAH) 45 MG Tab, Take 1 Tablet by mouth every day., Disp: 90 Tablet, Rfl: 3    methadone (DOLOPHINE) 10 MG/ML Conc, Take 30 mg by mouth every day. [facility] ([phone]) unable to verify dose, they are closed today (5/7/2023), Disp: , Rfl:     hydrOXYzine HCl (ATARAX) 50 MG Tab, Take 50 mg by mouth 3 times a day as needed for Anxiety., Disp: , Rfl: 3    No Known Allergies    Objective:     Vitals: /70   Pulse 100   Temp 36.4 °C (97.6 °F)   Resp 16   Ht 1.6 m (5' 3\")   Wt 87.1 kg (192 lb)   SpO2 97%   BMI 34.01 kg/m²    General: Alert, pleasant, NAD  HEENT: Normocephalic.  Neck supple.   Respiratory: no distress, no audible wheezing, RR -WNL  Skin: Warm, dry, no rashes.  Extremities: No leg edema. No discoloration  Neurological: No tremors  Psych:  Affect/mood is normal, judgement is good, memory is intact, grooming is appropriate.    Assessment/Plan:      1. Perimenopausal vasomotor symptoms  Ongoing, not well-controlled. Veozah was not approved with prior authorization.  Venlafaxine has not been helpful.  We will wean down off of venlafaxine with assistance of fluoxetine as this will assist with reducing withdrawal effects.  She is interested in starting on " HRT.  She does still have her uterus therefore she will need progesterone.  Will start on low dose 0.5 mg estradiol with drospirenone.  We have discussed common side effects, ADRs and precautions with taking estrogen supplement.  Patient is a nontobacco product user.  No family history of breast ovarian or uterine cancer,  Blood pressure stable, BMI less than 35.  Follow-up 3 months assess symptoms.  - venlafaxine XR (EFFEXOR XR) 37.5 MG CAPSULE SR 24 HR; Take 2 capsule daily by mouth for 4 weeks then take 1 capsule daily thereafter for 2-3 weeks then stop  Dispense: 90 Capsule; Refill: 0  - Drospirenone-Estradiol 0.25-0.5 MG Tab; Take 1 Tablet by mouth every day.  Dispense: 90 Tablet; Refill: 3    2. Hot flashes  Ongoing secondary to vasomotor symptoms.  Venlafaxine has not been helpful.  - Drospirenone-Estradiol 0.25-0.5 MG Tab; Take 1 Tablet by mouth every day.  Dispense: 90 Tablet; Refill: 3    3. Anxiety  Ongoing problem.  Buspirone caused significant drowsiness.  Venlafaxine has not been very helpful for this.  Plan to wean down off venlafaxine and use fluoxetine to taper off it.  She may consider staying on the fluoxetine 10 mg low-dose as this can be helpful for anxiety.  - venlafaxine XR (EFFEXOR XR) 37.5 MG CAPSULE SR 24 HR; Take 2 capsule daily by mouth for 4 weeks then take 1 capsule daily thereafter for 2-3 weeks then stop  Dispense: 90 Capsule; Refill: 0  - FLUoxetine (PROZAC) 10 MG Cap; Take one capsule by mouth daily.  Dispense: 90 Capsule; Refill: 0    4. Prediabetes  New problem.   A1c 6.0%  Have discussed dietary modifications.  Handout provided.  Recommend recheck A1c 6 months.    5. Vitamin D deficiency  Recommend vitamin D3 supplementation 4000 IU daily.    6. Vitamin B 12 deficiency  Recommend vitamin B 12 500 mcg daily.    7. Dyslipidemia  Stable, mildly elevated LDL.  Recommend lifestyle modifications.  The 10-year ASCVD risk score (Arlette MEZA, et al., 2019) is: 1.2%  Consider CAC screening,  low ASCVD risk.    No problem-specific Assessment & Plan notes found for this encounter.         Return in about 3 months (around 6/15/2024).            Monae GARDNER.

## 2024-03-16 RX ORDER — DROSPIRENONE AND ETHINYL ESTRADIOL TABLETS 0.02-3(28)
1 KIT ORAL
Qty: 28 TABLET | Refills: 3 | Status: SHIPPED | OUTPATIENT
Start: 2024-03-16

## 2024-06-23 DIAGNOSIS — R23.2 HOT FLASHES: ICD-10-CM

## 2024-06-23 DIAGNOSIS — N95.1 PERIMENOPAUSAL VASOMOTOR SYMPTOMS: ICD-10-CM

## 2024-06-24 RX ORDER — DROSPIRENONE AND ETHINYL ESTRADIOL TABLETS 0.02-3(28)
1 KIT ORAL
Qty: 84 TABLET | Refills: 1 | Status: SHIPPED | OUTPATIENT
Start: 2024-06-24

## 2024-06-24 NOTE — TELEPHONE ENCOUNTER
Received request via: Pharmacy    Was the patient seen in the last year in this department? Yes    Does the patient have an active prescription (recently filled or refills available) for medication(s) requested? No    Pharmacy Name:  [facility]/pharmacy #[id number] - [place], [place] - [address]     Does the patient have long-term Plus and need 100 day supply (blood pressure, diabetes and cholesterol meds only)? Patient does not have SCP

## 2024-07-12 ENCOUNTER — OFFICE VISIT (OUTPATIENT)
Dept: MEDICAL GROUP | Facility: MEDICAL CENTER | Age: 49
End: 2024-07-12
Payer: COMMERCIAL

## 2024-07-12 VITALS
HEIGHT: 63 IN | BODY MASS INDEX: 34.73 KG/M2 | WEIGHT: 196 LBS | OXYGEN SATURATION: 98 % | HEART RATE: 96 BPM | DIASTOLIC BLOOD PRESSURE: 88 MMHG | SYSTOLIC BLOOD PRESSURE: 108 MMHG | TEMPERATURE: 98.1 F

## 2024-07-12 DIAGNOSIS — R20.0 BILATERAL HAND NUMBNESS: Primary | ICD-10-CM

## 2024-07-12 DIAGNOSIS — F41.9 ANXIETY: ICD-10-CM

## 2024-07-12 PROCEDURE — 3074F SYST BP LT 130 MM HG: CPT | Performed by: STUDENT IN AN ORGANIZED HEALTH CARE EDUCATION/TRAINING PROGRAM

## 2024-07-12 PROCEDURE — 99214 OFFICE O/P EST MOD 30 MIN: CPT | Performed by: STUDENT IN AN ORGANIZED HEALTH CARE EDUCATION/TRAINING PROGRAM

## 2024-07-12 PROCEDURE — 3079F DIAST BP 80-89 MM HG: CPT | Performed by: STUDENT IN AN ORGANIZED HEALTH CARE EDUCATION/TRAINING PROGRAM

## 2024-07-12 RX ORDER — FLUOXETINE 10 MG/1
CAPSULE ORAL
Qty: 90 CAPSULE | Refills: 0 | Status: SHIPPED | OUTPATIENT
Start: 2024-07-12

## 2024-07-12 ASSESSMENT — FIBROSIS 4 INDEX: FIB4 SCORE: 0.8

## 2024-10-09 DIAGNOSIS — F41.9 ANXIETY: ICD-10-CM

## 2024-10-10 RX ORDER — FLUOXETINE 10 MG/1
CAPSULE ORAL
Qty: 90 CAPSULE | Refills: 3 | Status: SHIPPED | OUTPATIENT
Start: 2024-10-10

## 2024-11-11 RX ORDER — POLYETHYLENE GLYCOL-3350 AND ELECTROLYTES 236; 6.74; 5.86; 2.97; 22.74 G/274.31G; G/274.31G; G/274.31G; G/274.31G; G/274.31G
POWDER, FOR SOLUTION ORAL
COMMUNITY
Start: 2024-08-27

## 2024-11-12 ENCOUNTER — OFFICE VISIT (OUTPATIENT)
Dept: MEDICAL GROUP | Facility: MEDICAL CENTER | Age: 49
End: 2024-11-12
Payer: COMMERCIAL

## 2024-11-12 VITALS
OXYGEN SATURATION: 97 % | TEMPERATURE: 97.1 F | HEART RATE: 64 BPM | BODY MASS INDEX: 35.79 KG/M2 | WEIGHT: 202 LBS | HEIGHT: 63 IN | SYSTOLIC BLOOD PRESSURE: 120 MMHG | RESPIRATION RATE: 14 BRPM | DIASTOLIC BLOOD PRESSURE: 68 MMHG

## 2024-11-12 DIAGNOSIS — N95.1 PERIMENOPAUSAL VASOMOTOR SYMPTOMS: ICD-10-CM

## 2024-11-12 DIAGNOSIS — M54.2 NECK AND SHOULDER PAIN: ICD-10-CM

## 2024-11-12 DIAGNOSIS — M25.519 NECK AND SHOULDER PAIN: ICD-10-CM

## 2024-11-12 DIAGNOSIS — R20.0 BILATERAL HAND NUMBNESS: ICD-10-CM

## 2024-11-12 DIAGNOSIS — F41.9 ANXIETY: ICD-10-CM

## 2024-11-12 PROCEDURE — 3078F DIAST BP <80 MM HG: CPT | Performed by: NURSE PRACTITIONER

## 2024-11-12 PROCEDURE — 99214 OFFICE O/P EST MOD 30 MIN: CPT | Performed by: NURSE PRACTITIONER

## 2024-11-12 PROCEDURE — 3074F SYST BP LT 130 MM HG: CPT | Performed by: NURSE PRACTITIONER

## 2024-11-12 RX ORDER — FLUOXETINE 40 MG/1
40 CAPSULE ORAL DAILY
Qty: 90 CAPSULE | Refills: 3 | Status: SHIPPED | OUTPATIENT
Start: 2024-11-12

## 2024-11-12 RX ORDER — BACLOFEN 10 MG/1
5-10 TABLET ORAL NIGHTLY PRN
Qty: 90 TABLET | Refills: 0 | Status: SHIPPED | OUTPATIENT
Start: 2024-11-12

## 2024-11-12 ASSESSMENT — FIBROSIS 4 INDEX: FIB4 SCORE: .8181818181818181818

## 2024-11-12 NOTE — PROGRESS NOTES
Subjective:     Lakesha Fagan is a 49 y.o. female presents to discuss:   Chief Complaint   Patient presents with    Medication Refill     Verbal consent was acquired by the patient to use [employer] ambient listening note generation during this visit Yes   History of Present Illness  The patient presents for follow up    She has discontinued the use of venlafaxine and is currently taking fluoxetine, which she reports as beneficial. She does not experience any hot flashes and appreciates that the medication does not induce drowsiness, aiding her in her new job. She has also tried buspirone and found it effective. She is not using Loryna, a birth control pill, but continues to take Linzess for constipation. She is nearing the end of her methadone treatment.    She has been experiencing persistent pain in her right upper scapula for an extended period. The pain, which she describes as sharp, radiates through her anterior neck, causing discomfort and a sensation of tightness. Drinking water seems to alleviate the pain. The pain episodes are brief, lasting a few seconds, but have recently increased in duration to about a minute. She also reports numbness in her hands, particularly in two fingers, and suspects a pinched nerve. She has not previously used muscle relaxers and is considering B12 injections.      ROS: : see above      Current Outpatient Medications:     fluoxetine (PROZAC) 40 MG capsule, Take 1 Capsule by mouth every day., Disp: 90 Capsule, Rfl: 3    baclofen (LIORESAL) 10 MG Tab, Take 0.5-1 Tablets by mouth at bedtime as needed (muscle tension)., Disp: 90 Tablet, Rfl: 0    GAVILYTE-G 236 g Recon Soln, USE AS INSTRUCTED BY OFFICE, Disp: , Rfl:     LINZESS 290 MCG Cap, TAKE 290 MCG CAPSULE BY MOUTH ONCE A DAY FOR IRRITABLE BOWEL SYNDROME WITH CONSTIPATION, Disp: , Rfl:     methadone (DOLOPHINE) 10 MG/ML Conc, Take 30 mg by mouth every day. [place] [facility] ([phone]) unable to verify dose,  "they are closed today (5/7/2023), Disp: , Rfl:     No Known Allergies    Objective:     Vitals: /68   Pulse 64   Temp 36.2 °C (97.1 °F)   Resp 14   Ht 1.6 m (5' 3\")   Wt 91.6 kg (202 lb)   SpO2 97%   BMI 35.78 kg/m²    General: Alert, pleasant, NAD  HEENT: Normocephalic.  Neck supple.   Respiratory: no distress, no audible wheezing, RR -WNL  Skin: Warm, dry, no rashes.  Extremities: No leg edema. No discoloration  Neurological: No tremors  Psych:  Affect/mood is normal, judgement is good, memory is intact, grooming is appropriate.      Assessment/Plan:      Assessment & Plan  1. Anxiety/vasomotor symptoms.   Stable. Improved with Fluoxetine.   We discussed increasing to 40mg.     2. Right upper scapula pain/numbness   The pain appears to be musculoskeletal in nature and does not raise immediate concern. She is advised to perform neck stretches, apply heat at night, and consider a low-dose muscle relaxer trial. A prescription for baclofen 10 mg, half to 1 tablet nightly as needed, will be provided. She is also encouraged to engage in designated stretching activities at home and light gentle massage. The use of a neck massager is recommended.  I suspect this will likely improve the numbness sensation in her hands  Otherwise we can consider referral to Orthopedics    1. Anxiety  - fluoxetine (PROZAC) 40 MG capsule; Take 1 Capsule by mouth every day.  Dispense: 90 Capsule; Refill: 3    2. Neck and shoulder pain  - baclofen (LIORESAL) 10 MG Tab; Take 0.5-1 Tablets by mouth at bedtime as needed (muscle tension).  Dispense: 90 Tablet; Refill: 0    3. Bilateral hand numbness    4. Perimenopausal vasomotor symptoms      Return in about 6 months (around 5/12/2025) for or as needed..    {I have placed the above orders and discussed them with an approved delegating provider. The MA is performing the belw orders under the direction of Dr. Jeffrey GARDNER.    "

## 2025-01-03 DIAGNOSIS — E55.9 VITAMIN D DEFICIENCY: ICD-10-CM

## 2025-01-03 DIAGNOSIS — E53.8 VITAMIN B 12 DEFICIENCY: ICD-10-CM

## 2025-01-03 DIAGNOSIS — R73.03 PREDIABETES: ICD-10-CM

## 2025-01-03 DIAGNOSIS — N95.1 MENOPAUSAL SYMPTOMS: ICD-10-CM

## 2025-01-03 DIAGNOSIS — E78.5 DYSLIPIDEMIA: ICD-10-CM

## 2025-02-12 ENCOUNTER — OFFICE VISIT (OUTPATIENT)
Dept: MEDICAL GROUP | Facility: MEDICAL CENTER | Age: 50
End: 2025-02-12
Payer: COMMERCIAL

## 2025-02-12 VITALS
HEART RATE: 62 BPM | DIASTOLIC BLOOD PRESSURE: 70 MMHG | BODY MASS INDEX: 36.68 KG/M2 | HEIGHT: 63 IN | WEIGHT: 207 LBS | TEMPERATURE: 97.5 F | RESPIRATION RATE: 16 BRPM | SYSTOLIC BLOOD PRESSURE: 118 MMHG | OXYGEN SATURATION: 97 %

## 2025-02-12 DIAGNOSIS — E66.01 SEVERE OBESITY (BMI 35.0-39.9) WITH COMORBIDITY (HCC): ICD-10-CM

## 2025-02-12 DIAGNOSIS — Z23 NEED FOR VACCINATION: ICD-10-CM

## 2025-02-12 DIAGNOSIS — E55.9 VITAMIN D DEFICIENCY: ICD-10-CM

## 2025-02-12 DIAGNOSIS — N95.1 MENOPAUSAL SYMPTOMS: ICD-10-CM

## 2025-02-12 DIAGNOSIS — R63.5 WEIGHT GAIN: ICD-10-CM

## 2025-02-12 DIAGNOSIS — E53.8 VITAMIN B 12 DEFICIENCY: ICD-10-CM

## 2025-02-12 DIAGNOSIS — R73.03 PREDIABETES: ICD-10-CM

## 2025-02-12 DIAGNOSIS — E78.5 DYSLIPIDEMIA: ICD-10-CM

## 2025-02-12 PROCEDURE — 90471 IMMUNIZATION ADMIN: CPT | Performed by: NURSE PRACTITIONER

## 2025-02-12 PROCEDURE — 99213 OFFICE O/P EST LOW 20 MIN: CPT | Mod: 25 | Performed by: NURSE PRACTITIONER

## 2025-02-12 PROCEDURE — 3074F SYST BP LT 130 MM HG: CPT | Performed by: NURSE PRACTITIONER

## 2025-02-12 PROCEDURE — 3078F DIAST BP <80 MM HG: CPT | Performed by: NURSE PRACTITIONER

## 2025-02-12 PROCEDURE — 90656 IIV3 VACC NO PRSV 0.5 ML IM: CPT | Performed by: NURSE PRACTITIONER

## 2025-02-12 ASSESSMENT — FIBROSIS 4 INDEX: FIB4 SCORE: .8181818181818181818

## 2025-02-12 NOTE — PROGRESS NOTES
"Subjective:     Lakesha Fagan is a 49 y.o. female presents to discuss:     Chief Complaint   Patient presents with    Follow-Up     Verbal consent was acquired by the patient to use [employer] ambient listening note generation during this visit Yes   History of Present Illness  The patient presents for follow-up to review blood work however patient was unable to complete blood work patient did not have the requisitions.    She continues to.  Also symptoms including complexes, irregular menstrual cycles and weight gain.  Last menstrual cycle about 2 weeks ago prior to that she had 6-month amenorrhea.    She is currently on a methadone regimen, [facility]. Her goal is to discontinue methadone use, but she acknowledges the need for weight loss as a prerequisite.    She has recently transitioned to a sedentary job as a , which she believes is contributing to her weight gain.     SOCIAL HISTORY  She works as a .    Patient is that she completed mammogram at [facility].  We will request records.    ROS: : see above      Current Outpatient Medications:     fluoxetine (PROZAC) 40 MG capsule, Take 1 Capsule by mouth every day., Disp: 90 Capsule, Rfl: 3    baclofen (LIORESAL) 10 MG Tab, Take 0.5-1 Tablets by mouth at bedtime as needed (muscle tension)., Disp: 90 Tablet, Rfl: 0    GAVILYTE-G 236 g Recon Soln, USE AS INSTRUCTED BY OFFICE, Disp: , Rfl:     LINZESS 290 MCG Cap, TAKE 290 MCG CAPSULE BY MOUTH ONCE A DAY FOR IRRITABLE BOWEL SYNDROME WITH CONSTIPATION, Disp: , Rfl:     methadone (DOLOPHINE) 10 MG/ML Conc, Take 30 mg by mouth every day. [facility] ([phone]) unable to verify dose, they are closed today (5/7/2023), Disp: , Rfl:     No Known Allergies    Objective:   Vitals: /70   Pulse 62   Temp 36.4 °C (97.5 °F) (Temporal)   Resp 16   Ht 1.6 m (5' 3\")   Wt 93.9 kg (207 lb)   SpO2 97%   BMI 36.67 kg/m²    General: " Alert, pleasant, NAD  HEENT: Normocephalic.  Neck supple.   Respiratory: no distress, no audible wheezing, RR -WNL  Skin: Warm, dry, no rashes.  Extremities: No leg edema. No discoloration  Neurological: No tremors  Psych:  Affect/mood is normal, judgement is good, memory is intact, grooming is appropriate.  Assessment/Plan:      1. Prediabetes    2. Weight gain    3. Vitamin D deficiency    4. Vitamin B 12 deficiency    5. Dyslipidemia    6. Need for vaccination  - INFLUENZA VACCINE TRI INJ (PF)     7. Menopausal symptoms    8. Severe obesity (BMI 35.0-39.9) with comorbidity (HCC)  - Patient identified as having weight management issue.  Appropriate orders and counseling given.     Assessment & Plan  1. Obesity.  She is aware of her obesity and is seeking treatment options. Discussed that many insurances do not cover weight loss drugs, but we will submit a request to see if it gets approved.  Have informed her we will await her lab results and forward    2.  Prediabetes  Her hemoglobin A1c level was recorded as 6.0 in February 2024. A comprehensive laboratory workup will be ordered to assess her current health status. If her hemoglobin A1c levels are elevated, metformin will be initiated as the first line of treatment. If necessary, additional diabetes medications may be considered such as GLP-1's which would greatly benefit from.  Specifically interested in injectable GLP-1.      3. Irregular Menstrual Cycles.  She reports irregular menstrual cycles, with periods occurring unpredictably. She had a period two weeks ago after a six-month absence. Hormone testing is not deemed necessary at this time. If her periods cease completely, hormone testing may be considered to confirm menopause.    4. Methadone Use.  She is currently on methadone 21 mg, provided by the methadone clinic. She expresses a desire to discontinue methadone if her weight loss is successful. A plan to taper off methadone will be considered once her  weight loss goals are met.    5. Health Maintenance.  She will receive the influenza vaccine today. A record release will be sent to obtain her mammogram results from [facility].      Return for Lab results.    {I have placed the above orders and discussed them with an approved delegating provider. The MA is performing the below orders under the direction of Dr. Jeffrey CUELLO    Health maintenance:    General Recommendations:   Smoking: recommend complete avoidance of all tobacco products  Alcohol: recommend limiting consumption  Physical Activity: goal is 30 min of moderate activity 5 times a week  Weight Management and Nutrition: high vegetable, high protein diet like the Mediterranean diet, portion control, avoid excessive sugars, Low Glycemic Index foods, adequate hydration, sleep.

## 2025-02-12 NOTE — LETTER
[facility]  BERNADINE HinsonPDeloresRDeloresN.  [address] [facility] [address]  [place] [address]  Fax: [phone]   Authorization for Release/Disclosure of   Protected Health Information   Name: RIKA HANEY : 1975 SSN: [id number]   Address: [address] [place] [address]  [place] [address] Phone:    There are no phone numbers on file.   I authorize the entity listed below to release/disclose the PHI below to:   [facility]/BERNAIDNE HinsonP.RSTEVE and NOREEN HinsonRSTEVE   Provider or Entity Name:  [facility]   Address   City, State, Zip   Phone:      Fax:     Reason for request: continuity of care   Information to be released:    [  ] LAST COLONOSCOPY,  including any PATH  [  ] LAST DEXA  [ x ] LAST MAMMOGRAM  [  ] LAST PAP [  ] RETINA EXAM REPORT  [  ] IMMUNIZATION RECORDS  [  ] Release all info        _____ Care and treatment for drug and / or alcohol abuse  _____ HIV testing, infection status, or AIDS  _____ Genetic Testing    DATES OF SERVICE OR TIME PERIOD TO BE DISCLOSED: _____________  I understand and acknowledge that:  * This Authorization may be revoked at any time by you in writing, except if your health information has already been used or disclosed.  * Your health information that will be used or disclosed as a result of you signing this authorization could be re-disclosed by the recipient. If this occurs, your re-disclosed health information may no longer be protected by State or Federal laws.  * You may refuse to sign this Authorization. Your refusal will not affect your ability to obtain treatment.  * This Authorization becomes effective upon signing and will  on (date) __________.      If no date is indicated, this Authorization will  one (1) year from the signature date.    Name: Rika Haney  Signature: Date:   2025     PLEASE FAX REQUESTED RECORDS BACK TO: [phone]

## 2025-02-14 DIAGNOSIS — M54.2 NECK AND SHOULDER PAIN: ICD-10-CM

## 2025-02-14 DIAGNOSIS — M25.519 NECK AND SHOULDER PAIN: ICD-10-CM

## 2025-02-14 NOTE — TELEPHONE ENCOUNTER
Received request via: Pharmacy    Was the patient seen in the last year in this department? Yes    Does the patient have an active prescription (recently filled or refills available) for medication(s) requested? No    Pharmacy Name:   [facility]/pharmacy #[id number] - [place] - [address]  [address] [place] [address]  Phone: [phone] Fax: [phone]       Does the patient have correction Plus and need 100-day supply? (This applies to ALL medications) Patient does not have SCP

## 2025-02-17 RX ORDER — BACLOFEN 10 MG/1
5-10 TABLET ORAL NIGHTLY PRN
Qty: 90 TABLET | Refills: 0 | Status: SHIPPED | OUTPATIENT
Start: 2025-02-17

## 2025-02-28 LAB
25(OH)D3+25(OH)D2 SERPL-MCNC: 26.4 NG/ML (ref 30–100)
ALBUMIN SERPL-MCNC: 4.5 G/DL (ref 3.9–4.9)
ALP SERPL-CCNC: 93 IU/L (ref 44–121)
ALT SERPL-CCNC: 21 IU/L (ref 0–32)
AST SERPL-CCNC: 23 IU/L (ref 0–40)
BILIRUB SERPL-MCNC: 0.4 MG/DL (ref 0–1.2)
BUN SERPL-MCNC: 16 MG/DL (ref 6–24)
BUN/CREAT SERPL: 21 (ref 9–23)
CALCIUM SERPL-MCNC: 9.4 MG/DL (ref 8.7–10.2)
CHLORIDE SERPL-SCNC: 101 MMOL/L (ref 96–106)
CHOLEST SERPL-MCNC: 178 MG/DL (ref 100–199)
CO2 SERPL-SCNC: 25 MMOL/L (ref 20–29)
CREAT SERPL-MCNC: 0.75 MG/DL (ref 0.57–1)
EGFRCR SERPLBLD CKD-EPI 2021: 98 ML/MIN/1.73
ERYTHROCYTE [DISTWIDTH] IN BLOOD BY AUTOMATED COUNT: 13.9 % (ref 11.7–15.4)
GLOBULIN SER CALC-MCNC: 2.9 G/DL (ref 1.5–4.5)
GLUCOSE SERPL-MCNC: 86 MG/DL (ref 70–99)
HBA1C MFR BLD: 5.9 % (ref 4.8–5.6)
HCT VFR BLD AUTO: 42.4 % (ref 34–46.6)
HDLC SERPL-MCNC: 43 MG/DL
HGB BLD-MCNC: 13.9 G/DL (ref 11.1–15.9)
LDL CALC COMMENT:: ABNORMAL
LDLC SERPL CALC-MCNC: 108 MG/DL (ref 0–99)
MCH RBC QN AUTO: 30 PG (ref 26.6–33)
MCHC RBC AUTO-ENTMCNC: 32.8 G/DL (ref 31.5–35.7)
MCV RBC AUTO: 91 FL (ref 79–97)
NRBC BLD AUTO-RTO: NORMAL %
PLATELET # BLD AUTO: 281 X10E3/UL (ref 150–450)
POTASSIUM SERPL-SCNC: 4.5 MMOL/L (ref 3.5–5.2)
PROT SERPL-MCNC: 7.4 G/DL (ref 6–8.5)
RBC # BLD AUTO: 4.64 X10E6/UL (ref 3.77–5.28)
SODIUM SERPL-SCNC: 139 MMOL/L (ref 134–144)
TRIGL SERPL-MCNC: 152 MG/DL (ref 0–149)
TSH SERPL DL<=0.005 MIU/L-ACNC: 5.19 UIU/ML (ref 0.45–4.5)
VIT B12 SERPL-MCNC: 384 PG/ML (ref 232–1245)
VLDLC SERPL CALC-MCNC: 27 MG/DL (ref 5–40)
WBC # BLD AUTO: 7.5 X10E3/UL (ref 3.4–10.8)

## 2025-03-21 ENCOUNTER — OFFICE VISIT (OUTPATIENT)
Dept: MEDICAL GROUP | Facility: MEDICAL CENTER | Age: 50
End: 2025-03-21
Payer: COMMERCIAL

## 2025-03-21 VITALS
TEMPERATURE: 96.5 F | WEIGHT: 212.3 LBS | HEIGHT: 63 IN | HEART RATE: 86 BPM | DIASTOLIC BLOOD PRESSURE: 62 MMHG | SYSTOLIC BLOOD PRESSURE: 124 MMHG | OXYGEN SATURATION: 91 % | RESPIRATION RATE: 16 BRPM | BODY MASS INDEX: 37.62 KG/M2

## 2025-03-21 DIAGNOSIS — E78.5 DYSLIPIDEMIA: ICD-10-CM

## 2025-03-21 DIAGNOSIS — R73.09 ELEVATED HEMOGLOBIN A1C: ICD-10-CM

## 2025-03-21 DIAGNOSIS — E03.8 SUBCLINICAL HYPOTHYROIDISM: ICD-10-CM

## 2025-03-21 DIAGNOSIS — E66.01 SEVERE OBESITY (BMI 35.0-39.9) WITH COMORBIDITY (HCC): ICD-10-CM

## 2025-03-21 DIAGNOSIS — K76.0 HEPATIC STEATOSIS: ICD-10-CM

## 2025-03-21 DIAGNOSIS — G47.33 OSA (OBSTRUCTIVE SLEEP APNEA): ICD-10-CM

## 2025-03-21 DIAGNOSIS — E88.810 DYSMETABOLIC SYNDROME: ICD-10-CM

## 2025-03-21 PROCEDURE — 99214 OFFICE O/P EST MOD 30 MIN: CPT | Performed by: NURSE PRACTITIONER

## 2025-03-21 PROCEDURE — 3074F SYST BP LT 130 MM HG: CPT | Performed by: NURSE PRACTITIONER

## 2025-03-21 PROCEDURE — 3078F DIAST BP <80 MM HG: CPT | Performed by: NURSE PRACTITIONER

## 2025-03-21 RX ORDER — TIRZEPATIDE 2.5 MG/.5ML
0.5 INJECTION, SOLUTION SUBCUTANEOUS
Qty: 2 ML | Refills: 0 | Status: SHIPPED | OUTPATIENT
Start: 2025-03-21 | End: 2025-04-20

## 2025-03-21 RX ORDER — LEVOTHYROXINE SODIUM 50 UG/1
50 TABLET ORAL
Qty: 90 TABLET | Refills: 3 | Status: SHIPPED | OUTPATIENT
Start: 2025-03-21

## 2025-03-21 ASSESSMENT — FIBROSIS 4 INDEX: FIB4 SCORE: 0.86

## 2025-03-21 ASSESSMENT — PATIENT HEALTH QUESTIONNAIRE - PHQ9: CLINICAL INTERPRETATION OF PHQ2 SCORE: 0

## 2025-03-21 NOTE — PROGRESS NOTES
"Subjective:     Lakesha Fagan is a 49 y.o. female presents to discuss:     Chief Complaint   Patient presents with    Follow-Up     Verbal consent was acquired by the patient to use [employer] ambient listening note generation during this visit 00Yes   History of Present Illness  Follow-up  Review blood work    Most recent blood work.    A1c 5.9%, previous 6.0%    Suboptimal vitamin D3  Borderline suboptimal vitamin B12    Elevated TSH at 5.190.  Patient does endorse weight gain, mild constipation and fatigue.  Previous office visit we did discuss her interest in injectable medications to assist with weight loss.  She also does have sleep apnea.      ROS: : see above      Current Outpatient Medications:     levothyroxine (SYNTHROID) 50 MCG Tab, Take 1 Tablet by mouth every morning on an empty stomach., Disp: 90 Tablet, Rfl: 3    Tirzepatide (MOUNJARO) 2.5 MG/0.5ML Solution Auto-injector, Inject 0.5 mL under the skin every 7 days for 30 days., Disp: 2 mL, Rfl: 0    baclofen (LIORESAL) 10 MG Tab, TAKE 0.5-1 TABLETS BY MOUTH AT BEDTIME AS NEEDED (MUSCLE TENSION)., Disp: 90 Tablet, Rfl: 0    fluoxetine (PROZAC) 40 MG capsule, Take 1 Capsule by mouth every day., Disp: 90 Capsule, Rfl: 3    GAVILYTE-G 236 g Recon Soln, USE AS INSTRUCTED BY OFFICE, Disp: , Rfl:     LINZESS 290 MCG Cap, TAKE 290 MCG CAPSULE BY MOUTH ONCE A DAY FOR IRRITABLE BOWEL SYNDROME WITH CONSTIPATION, Disp: , Rfl:     methadone (DOLOPHINE) 10 MG/ML Conc, Take 30 mg by mouth every day. [place] [facility] ([phone]) unable to verify dose, they are closed today (5/7/2023), Disp: , Rfl:     No Known Allergies    Objective:   Vitals: /62   Pulse 86   Temp 35.8 °C (96.5 °F) (Temporal)   Resp 16   Ht 1.6 m (5' 3\")   Wt 96.3 kg (212 lb 4.9 oz)   SpO2 91%   BMI 37.61 kg/m²    General: Alert, pleasant, NAD  HEENT: Normocephalic.  Neck supple.   Respiratory: no distress, no audible wheezing, RR -WNL  Skin: Warm, dry, no " rashes.  Extremities: No leg edema. No discoloration  Neurological: No tremors  Psych:  Affect/mood is normal, judgement is good, memory is intact, grooming is appropriate.  Assessment/Plan:      1. Subclinical hypothyroidism  - TSH; Future  - FREE THYROXINE; Future  - T3 FREE; Future  - levothyroxine (SYNTHROID) 50 MCG Tab; Take 1 Tablet by mouth every morning on an empty stomach.  Dispense: 90 Tablet; Refill: 3    2. Dysmetabolic syndrome  - levothyroxine (SYNTHROID) 50 MCG Tab; Take 1 Tablet by mouth every morning on an empty stomach.  Dispense: 90 Tablet; Refill: 3  - Tirzepatide (MOUNJARO) 2.5 MG/0.5ML Solution Auto-injector; Inject 0.5 mL under the skin every 7 days for 30 days.  Dispense: 2 mL; Refill: 0    3. Elevated hemoglobin A1c  - levothyroxine (SYNTHROID) 50 MCG Tab; Take 1 Tablet by mouth every morning on an empty stomach.  Dispense: 90 Tablet; Refill: 3  - Tirzepatide (MOUNJARO) 2.5 MG/0.5ML Solution Auto-injector; Inject 0.5 mL under the skin every 7 days for 30 days.  Dispense: 2 mL; Refill: 0    4. Dyslipidemia  - levothyroxine (SYNTHROID) 50 MCG Tab; Take 1 Tablet by mouth every morning on an empty stomach.  Dispense: 90 Tablet; Refill: 3  - Tirzepatide (MOUNJARO) 2.5 MG/0.5ML Solution Auto-injector; Inject 0.5 mL under the skin every 7 days for 30 days.  Dispense: 2 mL; Refill: 0    5. ALEXANDRA (obstructive sleep apnea)  - levothyroxine (SYNTHROID) 50 MCG Tab; Take 1 Tablet by mouth every morning on an empty stomach.  Dispense: 90 Tablet; Refill: 3  - Tirzepatide (MOUNJARO) 2.5 MG/0.5ML Solution Auto-injector; Inject 0.5 mL under the skin every 7 days for 30 days.  Dispense: 2 mL; Refill: 0    6. Hepatic steatosis  - levothyroxine (SYNTHROID) 50 MCG Tab; Take 1 Tablet by mouth every morning on an empty stomach.  Dispense: 90 Tablet; Refill: 3  - Tirzepatide (MOUNJARO) 2.5 MG/0.5ML Solution Auto-injector; Inject 0.5 mL under the skin every 7 days for 30 days.  Dispense: 2 mL; Refill: 0    7. Severe  obesity (BMI 35.0-39.9) with comorbidity (HCC)  - levothyroxine (SYNTHROID) 50 MCG Tab; Take 1 Tablet by mouth every morning on an empty stomach.  Dispense: 90 Tablet; Refill: 3  - Tirzepatide (MOUNJARO) 2.5 MG/0.5ML Solution Auto-injector; Inject 0.5 mL under the skin every 7 days for 30 days.  Dispense: 2 mL; Refill: 0       Assessment & Plan  1. Weight gain.  Her weight gain could be attributed to subclinical hypothyroid.  We will start her on levothyroxine 50 mcg daily, recheck thyroid function labs in about 8 weeks.  Make adjustments as necessary.      However given her risk for cardiovascular events due to sleep apnea, prediabetes, dysmetabolic syndrome, obesity she would benefit significantly in reducing her overall cardiovascular risk with that reduction of weight and the cardiovascular benefits from GLP-1.     -A prescription for Mounjaro will be sent to the pharmacy, pending insurance approval.  - The patient was informed about the availability of coupon cards on the manufacture website that she may set up.  We discussed that if her insurance pays for any portion of the Mounjaro then she will be able to use the manufacturing coupon card for cost reduction.    - If insurance does not cover it, alternative options like Zepbound will be considered.   -She will need to notify us via [employer] regarding the status of this process.  -We may need to attempt a prior authorization    The patient was educated on the proper administration of these injectables and potential side effects such as nausea and upset stomach were discussed.    2. Subclinical hypothyroidism.  Her thyroid levels are slightly elevated, which may be contributing to her weight gain, constipation, and previously discussed irregular menstrual cycles. A prescription for levothyroxine has been issued to [facility]. She was instructed to take this medication first thing in the morning on an empty stomach with water, and to wait 30 minutes before consuming  coffee or food. She was also advised not to take vitamins or antacids within 4 hours of taking levothyroxine. A recheck of her thyroid levels will be conducted in 8 weeks to assess the effectiveness of the treatment.    3. Vitamin D deficiency.  Her vitamin D levels are low. She was advised to take vitamin D3 at a dosage of 4000 IU daily.    4. Vitamin B12 deficiency.  Her vitamin B12 levels are on the lower end. She was advised to take vitamin B12 at a dosage of 500 mcg daily.    Follow-up  The patient will follow up in 3 months.    Return in about 3 months (around 6/21/2025).    {I have placed the above orders and discussed them with an approved delegating provider. The MA is performing the below orders under the direction of Dr. Jeffrey CUELLO    Health maintenance:    General Recommendations:   Smoking: recommend complete avoidance of all tobacco products  Alcohol: recommend limiting consumption  Physical Activity: goal is 30 min of moderate activity 5 times a week  Weight Management and Nutrition: high vegetable, high protein diet like the Mediterranean diet, portion control, avoid excessive sugars, Low Glycemic Index foods, adequate hydration, sleep.

## 2025-03-22 ENCOUNTER — RESULTS FOLLOW-UP (OUTPATIENT)
Dept: MEDICAL GROUP | Facility: MEDICAL CENTER | Age: 50
End: 2025-03-22

## 2025-04-16 ENCOUNTER — OFFICE VISIT (OUTPATIENT)
Dept: PHYSICAL MEDICINE AND REHAB | Facility: MEDICAL CENTER | Age: 50
End: 2025-04-16
Payer: COMMERCIAL

## 2025-04-16 VITALS
HEART RATE: 93 BPM | TEMPERATURE: 97.9 F | WEIGHT: 212 LBS | BODY MASS INDEX: 37.56 KG/M2 | HEIGHT: 63 IN | OXYGEN SATURATION: 94 % | SYSTOLIC BLOOD PRESSURE: 122 MMHG | RESPIRATION RATE: 16 BRPM | DIASTOLIC BLOOD PRESSURE: 72 MMHG

## 2025-04-16 DIAGNOSIS — M54.16 LUMBAR RADICULOPATHY: ICD-10-CM

## 2025-04-16 DIAGNOSIS — G56.03 BILATERAL CARPAL TUNNEL SYNDROME: ICD-10-CM

## 2025-04-16 PROCEDURE — 95913 NRV CNDJ TEST 13/> STUDIES: CPT | Performed by: STUDENT IN AN ORGANIZED HEALTH CARE EDUCATION/TRAINING PROGRAM

## 2025-04-16 PROCEDURE — 3074F SYST BP LT 130 MM HG: CPT | Performed by: STUDENT IN AN ORGANIZED HEALTH CARE EDUCATION/TRAINING PROGRAM

## 2025-04-16 PROCEDURE — 95886 MUSC TEST DONE W/N TEST COMP: CPT | Performed by: STUDENT IN AN ORGANIZED HEALTH CARE EDUCATION/TRAINING PROGRAM

## 2025-04-16 PROCEDURE — 3078F DIAST BP <80 MM HG: CPT | Performed by: STUDENT IN AN ORGANIZED HEALTH CARE EDUCATION/TRAINING PROGRAM

## 2025-04-16 PROCEDURE — 95885 MUSC TST DONE W/NERV TST LIM: CPT | Mod: 59,LT | Performed by: STUDENT IN AN ORGANIZED HEALTH CARE EDUCATION/TRAINING PROGRAM

## 2025-04-16 ASSESSMENT — PAIN SCALES - GENERAL: PAINLEVEL_OUTOF10: 8=MODERATE-SEVERE PAIN

## 2025-04-16 ASSESSMENT — FIBROSIS 4 INDEX: FIB4 SCORE: 0.86

## 2025-04-16 NOTE — PROCEDURES
"Electromyography Report          Name: Lakesha Fagan    MRN: [id number]   YOB: 1975 (49 y.o.)   Sex: female   Vitals:  Vitals:    04/16/25 0756   BP: 122/72   Weight: 96.2 kg (212 lb)   Height: 1.6 m (5' 3\")     Body mass index is 37.55 kg/m².    Examining Physician: Jaron Plummer D.O.     Visit Diagnoses     ICD-10-CM   1. Bilateral carpal tunnel syndrome  G56.03   2. Lumbar radiculopathy  M54.16       EMG Examination Date: 4/16/2025     Impression:      This is an abnormal study.   The study is diagnostic for a BILATERAL  median mononeuropathy at the wrist consistent with carpal tunnel syndrome that is severe at left and right.   The exam today points AGAINST  ulnar neuropathy (cubital tunnel syndrome) and radial mononeuropathy bilaterally.   The exam today points AGAINST brachial plexopathy, cervical radiculopathy of the right upper extremity.   There is  electrodiagnostic evidence to suggest an acute LEFT lower extremity radiculopathy affecting the left S1 nerve root.       Recommendations: Hand surgery referral placed today for consultation for bilateral severe carpal tunnel syndrome.    History:   This is a 49-year-old female presents today for EMG evaluation of bilateral upper extremities and left lower extremity.  Patient notes chronic pain as well as paresthesias to bilateral hands that been present for at least the past 6 months.  She also notes new onset within the past month of pain at the low back with radiation to the left lower extremity with numbness and tingling at the plantar surface of the left foot.  She notes weakness of bilateral hands, she is right-hand dominant.  She states is difficult to hold objects at times.    Relevant Medical Hx:  Thyroid disease  positive  Cancer   negative    Diabetes  negative  Autoimmune disease negative    Physical exam:    GEN: No acute distress, well nourished, well developed  HEENT: Normal cephalic, PERRLA, EOMI  EXT: No clubbing " cyanosis or edema  MUSCULOSKELETAL:   No asymmetry, mass or tenderness to palpation.   Strength is 5/5 in bilateral upper and lower extremities except for 4/5 strength with left foot plantar flexion  NEURO: Patient is alert, awake and oriented x 3, muscle tone is normal without clonus.  DTR's are symmetrical and normal in bilateral upper and lower extremities.   Sensation to light touch is diminished throughout bilateral hands  Phalen's is positive bilaterally  Tinel's at wrist is positive bilaterally  + CT compression testing bilaterally    Nerve Conduction Studies and Electromyography  Examination Findings:         Evaluation of the left Fibular (EDB) motor, the left sural Sensory, and the left ulnar Sensory nerves showed reduced amplitude (L1.10, L5, L14 µV).    The left Median (APB) motor and the right Median (APB) motor nerves showed prolonged distal onset latency (L5.2, R6.0 ms).    The left median Sensory nerve showed prolonged distal peak latency (5.7 ms) and reduced amplitude (4 µV).    The right median Sensory nerve showed no response.    The right ulnar Sensory nerve showed prolonged distal peak latency (3.3 ms).    All remaining nerves (as indicated in the following tables) were within normal limits.    Needle evaluation of the left Gastrocnemius, the left Abductor pollicis brevis, and the right Abductor pollicis brevis muscles showed increased insertional activity and slightly increased spontaneous activity.    All remaining muscles (as indicated in the following table) showed no evidence of electrical instability.               Nerve conduction studies (NCS) and electromyography (EMG) are utilized to evaluate direct or indirect damage to the peripheral nervous system. NCS are performed to measure the nerve(s) response(s) to electrostimulation across a given nerve segment. EMG evaluates the passive and active electrical activity of the muscle(s) in question.  Muscles are innervated by specific  peripheral nerves and roots. Often times, several nerves the muscle to be examined in order to determine the presence or absence of the disease process. Furthermore, nerves and muscles may need to be tested in a [id number] comparison, as well as in additional extremities, as this may be crucial in characterizing the extent of the disease process, which may be diffuse or isolated and of varying degree of severity. The extent of the neurodiagnostic exam is justified as it may help arrive to a proper diagnosis, which ultimately may contribute to better management of the patient. Therefore, the nerves to muscles examined during the study were medically necessary.    Unless otherwise noted, temperature of the extremity(s) study was monitored before and during the examination and remained between 32 and 36 degrees C for the upper extremities, and between 30 and 36 degrees C for the lower extremities. The patient tolerated testing well, without any complications. The study was done with a monopolar needle examination.   Reference values are from the [facility] normative data guidelines and [place] related to age guidelines.     cpt 41584. Nerve conduction studies, 13+ studies  CPT 95886 x 2 . needle electromyography, complete, each extremity. two extremities  CPT 83857. needle electromyography, limited, each extremity.     Jaron Plummer D.O.

## 2025-04-17 ENCOUNTER — APPOINTMENT (OUTPATIENT)
Dept: NEUROLOGY | Facility: MEDICAL CENTER | Age: 50
End: 2025-04-17
Attending: STUDENT IN AN ORGANIZED HEALTH CARE EDUCATION/TRAINING PROGRAM
Payer: COMMERCIAL

## 2025-04-18 NOTE — Clinical Note
REFERRAL APPROVAL NOTICE         Sent on April 18, 2025                   Lakesha Fagan  [address]  [place] [address]                   Dear Ms. Choco Fagan,    After a careful review of the medical information and benefit coverage, [facility] has processed your referral. See below for additional details.    If applicable, you must be actively enrolled with your insurance for coverage of the authorized service. If you have any questions regarding your coverage, please contact your insurance directly.    REFERRAL INFORMATION   Referral #:  [id number]  Referred-To Provider    Referred-By Provider:  Orthopedic Surgery    Jaron Plummer D.O.   [facility]      [address]  [address]  [place] [address]  [phone] [address]  [place] [address]  [phone]    Referral Start Date:  04/16/2025  Referral End Date:   04/17/2026             SCHEDULING  If you do not already have an appointment, please call [phone] to make an appointment.     MORE INFORMATION  If you do not already have a [facility] account, sign up at: [email or web address]  You can access your medical information, make appointments, see lab results, billing information, and more.  If you have questions regarding this referral, please contact  the [facility] Referrals department at:             [phone]. Monday - Friday 8:00AM - 5:00PM.     Sincerely,    [facility]

## 2025-05-30 ENCOUNTER — OFFICE VISIT (OUTPATIENT)
Dept: MEDICAL GROUP | Facility: MEDICAL CENTER | Age: 50
End: 2025-05-30
Payer: COMMERCIAL

## 2025-05-30 VITALS
BODY MASS INDEX: 38.91 KG/M2 | TEMPERATURE: 97.4 F | OXYGEN SATURATION: 95 % | HEIGHT: 63 IN | DIASTOLIC BLOOD PRESSURE: 70 MMHG | HEART RATE: 89 BPM | SYSTOLIC BLOOD PRESSURE: 122 MMHG | RESPIRATION RATE: 18 BRPM | WEIGHT: 219.6 LBS

## 2025-05-30 DIAGNOSIS — R06.09 DOE (DYSPNEA ON EXERTION): Primary | ICD-10-CM

## 2025-05-30 DIAGNOSIS — M54.9 UPPER BACK PAIN: ICD-10-CM

## 2025-05-30 DIAGNOSIS — M62.838 MUSCLE SPASM: ICD-10-CM

## 2025-05-30 DIAGNOSIS — R12 HEARTBURN: ICD-10-CM

## 2025-05-30 RX ORDER — ALBUTEROL SULFATE 90 UG/1
2 INHALANT RESPIRATORY (INHALATION) EVERY 6 HOURS PRN
Qty: 8.5 G | Refills: 2 | Status: SHIPPED | OUTPATIENT
Start: 2025-05-30

## 2025-05-30 ASSESSMENT — FIBROSIS 4 INDEX: FIB4 SCORE: 0.86

## 2025-05-30 NOTE — LETTER
[facility]  NOREEN HinsonRDeloresN.  [address] [facility] [address]  [place] [address]  Fax: [phone]   Authorization for Release/Disclosure of   Protected Health Information   Name: RIKA HANEY : 1975 SSN: [id number]   Address: [address] [place] [address]  [place] [address] Phone:    There are no phone numbers on file.   I authorize the entity listed below to release/disclose the PHI below to:   [facility]/NOREEN HinsonRSTEVE and CUATE Hinson   Provider or Entity Name:  [facility] [[email or web address] ([facility]   Address   City, State, [facility]   Phone:      Fax:     Reason for request: continuity of care   Information to be released:    [  ] LAST COLONOSCOPY,  including any PATH REPORT and follow-up   [  ] LAST DEXA  [  ] LAST MAMMOGRAM  [  ] LAST PAP  [  ] LAST LABS [  ] RETINA EXAM REPORT  [  ] IMMUNIZATION RECORDS  [ x ] Release all info        _____ Care and treatment for drug and / or alcohol abuse  _____ HIV testing, infection status, or AIDS  _____ Genetic Testing    DATES OF SERVICE OR TIME PERIOD TO BE DISCLOSED: _____________  I understand and acknowledge that:  * This Authorization may be revoked at any time by you in writing, except if your health information has already been used or disclosed.  * Your health information that will be used or disclosed as a result of you signing this authorization could be re-disclosed by the recipient. If this occurs, your re-disclosed health information may no longer be protected by State or Federal laws.  * You may refuse to sign this Authorization. Your refusal will not affect your ability to obtain treatment.  * This Authorization becomes effective upon signing and will  on (date) __________.      If no date is indicated, this Authorization will  one (1) year from the signature date.    Name: Rika Haney  Signature: Date:   2025     PLEASE FAX REQUESTED RECORDS BACK TO:  [phone]

## 2025-05-30 NOTE — PROGRESS NOTES
Subjective:     HPI:     Lakesha Fagan is a 49 y.o. female presents to discuss:   Chief Complaint   Patient presents with    Other     Patient states she has pain in the middle of her back and radiates  towards her chest. Pain last about 5 min. This has happened at random. Patient was in the ER this Sat 5/24. She was administered medication and did help. She doesn't remember what it was.      Verbal consent was acquired by the patient to use [employer] ambient listening note generation during this visit Yes      History of Present Illness  The patient presents for evaluation of back pain, shortness of breath, and heartburn.    She reports experiencing intermittent, sharp pain in the middle of her upper back, which radiates towards her chest and throat. This pain typically lasts for approximately 5 minutes and occurs randomly. She describes the pain as sharp and initially localized between the shoulder blades before radiating to her throat. She does not experience any associated arm pain, numbness, tingling, or shooting pains. The pain has been present for several years, with episodes lasting only seconds and occurring every 3 to 4 months. However, she notes that the duration and severity of the pain have increased with age. She sought emergency care on Saturday, where she received intravenous medication that provided relief. An EKG and MRI were performed, both of which yielded normal results. She was discharged with instructions to follow up with her primary care physician.  -we do not have records at this time to review  -we are requesting records from [facility]    Over the past 3 weeks, she has been experiencing significant shortness of breath, even during minimal exertion such as walking. She reports no history of COPD or asthma and does not use a CPAP machine. She also reports no cough or leg swelling.     She experiences heartburn and takes omeprazole as needed, but not daily.    She had her  "gallbladder removed in 2000.    PAST SURGICAL HISTORY: Gallbladder removal in 2000.     No problems updated.     ROS: : see above    Current Medications[1]    Allergies[2]    Objective:     Vitals: /70   Pulse 89   Temp 36.3 °C (97.4 °F) (Temporal)   Resp 18   Ht 1.6 m (5' 3\")   Wt 99.6 kg (219 lb 9.6 oz)   SpO2 95%   BMI 38.90 kg/m²      General: Alert, pleasant, NAD  HEENT: Normocephalic.  Neck supple.   Respiratory: no distress, no audible wheezing, RR -WNL  Heart: HRR no murmur  Skin: Warm, dry, no rashes.  MSK. No reproducible pain on exam.  Extremities: No leg edema. No discoloration  Neurological: No tremors  Psych:  Affect/mood is normal, judgement is good, memory is intact, grooming is appropriate.    Assessment/Plan:      1. Upper back pain    2. Muscle spasm    3. BHANDARI (dyspnea on exertion)  - albuterol 108 (90 Base) MCG/ACT Aero Soln inhalation aerosol; Inhale 2 Puffs every 6 hours as needed for Shortness of Breath.  Dispense: 8.5 g; Refill: 2    4. Heartburn       Assessment & Plan  Back pain  The etiology of the pain remains uncertain, with potential causes including musculoskeletal issues or nerve impingement. The absence of leg swelling or fluid overload symptoms suggests that cardiac involvement is unlikely. The pain could also be related to gastroesophageal reflux disease (GERD).  Diagnostic plan: A request for her medical records from [facility] will be submitted for further evaluation. Pt reported ACS ruled out with EKG, and imaging-although does not recall.   Treatment plan: She will commence a regimen of omeprazole 20 mg daily for a duration of 6 to 8 weeks. If the omeprazole does not alleviate her symptoms, further diagnostic tests may be considered.    Shortness of breath  The shortness of breath could be due to various factors such as anemia, heart issues, smoking, or allergies. Vitals are normal, and there is no history of COPD or asthma.  No acute distress  Treatment plan: " She is advised to use an albuterol inhaler as needed to manage her shortness of breath. If the inhaler proves effective, it may indicate a respiratory issue. A prescription for an albuterol inhaler has been provided.  ?consider PFT's    Heartburn  She reports experiencing heartburn and has been using omeprazole as needed.  Treatment plan: She will start taking omeprazole 20 mg daily for 6 to 8 weeks to manage her heartburn. This may also help alleviate her shortness of breath if it is related to GERD.     Return in about 3 months (around 8/30/2025).      Monae CUELLO         [1]   Current Outpatient Medications:     albuterol 108 (90 Base) MCG/ACT Aero Soln inhalation aerosol, Inhale 2 Puffs every 6 hours as needed for Shortness of Breath., Disp: 8.5 g, Rfl: 2    levothyroxine (SYNTHROID) 50 MCG Tab, Take 1 Tablet by mouth every morning on an empty stomach., Disp: 90 Tablet, Rfl: 3    baclofen (LIORESAL) 10 MG Tab, TAKE 0.5-1 TABLETS BY MOUTH AT BEDTIME AS NEEDED (MUSCLE TENSION)., Disp: 90 Tablet, Rfl: 0    fluoxetine (PROZAC) 40 MG capsule, Take 1 Capsule by mouth every day., Disp: 90 Capsule, Rfl: 3    GAVILYTE-G 236 g Recon Soln, USE AS INSTRUCTED BY OFFICE, Disp: , Rfl:     LINZESS 290 MCG Cap, TAKE 290 MCG CAPSULE BY MOUTH ONCE A DAY FOR IRRITABLE BOWEL SYNDROME WITH CONSTIPATION, Disp: , Rfl:     methadone (DOLOPHINE) 10 MG/ML Conc, Take 30 mg by mouth every day. [facility] ([phone]) unable to verify dose, they are closed today (5/7/2023), Disp: , Rfl:   [2] No Known Allergies

## 2025-06-10 ENCOUNTER — APPOINTMENT (OUTPATIENT)
Dept: RADIOLOGY | Facility: MEDICAL CENTER | Age: 50
End: 2025-06-10
Attending: EMERGENCY MEDICINE
Payer: COMMERCIAL

## 2025-06-10 ENCOUNTER — HOSPITAL ENCOUNTER (EMERGENCY)
Facility: MEDICAL CENTER | Age: 50
End: 2025-06-10
Attending: EMERGENCY MEDICINE
Payer: COMMERCIAL

## 2025-06-10 VITALS
DIASTOLIC BLOOD PRESSURE: 60 MMHG | HEART RATE: 16 BPM | SYSTOLIC BLOOD PRESSURE: 131 MMHG | HEIGHT: 63 IN | RESPIRATION RATE: 16 BRPM | BODY MASS INDEX: 38.28 KG/M2 | WEIGHT: 216.05 LBS | OXYGEN SATURATION: 96 % | TEMPERATURE: 98.7 F

## 2025-06-10 DIAGNOSIS — G44.209 ACUTE NON INTRACTABLE TENSION-TYPE HEADACHE: Primary | ICD-10-CM

## 2025-06-10 DIAGNOSIS — E86.0 DEHYDRATION: ICD-10-CM

## 2025-06-10 DIAGNOSIS — B34.9 ACUTE VIRAL SYNDROME: ICD-10-CM

## 2025-06-10 DIAGNOSIS — K59.00 CONSTIPATION, UNSPECIFIED CONSTIPATION TYPE: ICD-10-CM

## 2025-06-10 LAB
ALBUMIN SERPL BCP-MCNC: 4.7 G/DL (ref 3.2–4.9)
ALBUMIN/GLOB SERPL: 1.4 G/DL
ALP SERPL-CCNC: 113 U/L (ref 30–99)
ALT SERPL-CCNC: 24 U/L (ref 2–50)
ANION GAP SERPL CALC-SCNC: 13 MMOL/L (ref 7–16)
APPEARANCE UR: CLEAR
AST SERPL-CCNC: 24 U/L (ref 12–45)
BACTERIA #/AREA URNS HPF: ABNORMAL /HPF
BASOPHILS # BLD AUTO: 0.1 % (ref 0–1.8)
BASOPHILS # BLD: 0.01 K/UL (ref 0–0.12)
BILIRUB SERPL-MCNC: 0.3 MG/DL (ref 0.1–1.5)
BILIRUB UR QL STRIP.AUTO: NEGATIVE
BUN SERPL-MCNC: 17 MG/DL (ref 8–22)
CALCIUM ALBUM COR SERPL-MCNC: 9.1 MG/DL (ref 8.5–10.5)
CALCIUM SERPL-MCNC: 9.7 MG/DL (ref 8.5–10.5)
CASTS URNS QL MICRO: ABNORMAL /LPF (ref 0–2)
CHLORIDE SERPL-SCNC: 100 MMOL/L (ref 96–112)
CO2 SERPL-SCNC: 23 MMOL/L (ref 20–33)
COLOR UR: YELLOW
CREAT SERPL-MCNC: 0.77 MG/DL (ref 0.5–1.4)
EOSINOPHIL # BLD AUTO: 0 K/UL (ref 0–0.51)
EOSINOPHIL NFR BLD: 0 % (ref 0–6.9)
EPITHELIAL CELLS 1715: ABNORMAL /HPF (ref 0–5)
ERYTHROCYTE [DISTWIDTH] IN BLOOD BY AUTOMATED COUNT: 42.2 FL (ref 35.9–50)
GFR SERPLBLD CREATININE-BSD FMLA CKD-EPI: 95 ML/MIN/1.73 M 2
GLOBULIN SER CALC-MCNC: 3.4 G/DL (ref 1.9–3.5)
GLUCOSE SERPL-MCNC: 139 MG/DL (ref 65–99)
GLUCOSE UR STRIP.AUTO-MCNC: NEGATIVE MG/DL
HCG SERPL QL: NEGATIVE
HCT VFR BLD AUTO: 41.6 % (ref 37–47)
HGB BLD-MCNC: 14.1 G/DL (ref 12–16)
IMM GRANULOCYTES # BLD AUTO: 0.07 K/UL (ref 0–0.11)
IMM GRANULOCYTES NFR BLD AUTO: 1 % (ref 0–0.9)
KETONES UR STRIP.AUTO-MCNC: NEGATIVE MG/DL
LACTATE SERPL-SCNC: 2.6 MMOL/L (ref 0.5–2)
LEUKOCYTE ESTERASE UR QL STRIP.AUTO: ABNORMAL
LIPASE SERPL-CCNC: 15 U/L (ref 11–82)
LYMPHOCYTES # BLD AUTO: 1.56 K/UL (ref 1–4.8)
LYMPHOCYTES NFR BLD: 21.2 % (ref 22–41)
MCH RBC QN AUTO: 30.6 PG (ref 27–33)
MCHC RBC AUTO-ENTMCNC: 33.9 G/DL (ref 32.2–35.5)
MCV RBC AUTO: 90.2 FL (ref 81.4–97.8)
MICRO URNS: ABNORMAL
MONOCYTES # BLD AUTO: 0.15 K/UL (ref 0–0.85)
MONOCYTES NFR BLD AUTO: 2 % (ref 0–13.4)
NEUTROPHILS # BLD AUTO: 5.57 K/UL (ref 1.82–7.42)
NEUTROPHILS NFR BLD: 75.7 % (ref 44–72)
NITRITE UR QL STRIP.AUTO: NEGATIVE
NRBC # BLD AUTO: 0 K/UL
NRBC BLD-RTO: 0 /100 WBC (ref 0–0.2)
PH UR STRIP.AUTO: 7 [PH] (ref 5–8)
PLATELET # BLD AUTO: 298 K/UL (ref 164–446)
PMV BLD AUTO: 8.7 FL (ref 9–12.9)
POTASSIUM SERPL-SCNC: 4.4 MMOL/L (ref 3.6–5.5)
PROT SERPL-MCNC: 8.1 G/DL (ref 6–8.2)
PROT UR QL STRIP: NEGATIVE MG/DL
RBC # BLD AUTO: 4.61 M/UL (ref 4.2–5.4)
RBC # URNS HPF: ABNORMAL /HPF
RBC UR QL AUTO: ABNORMAL
SODIUM SERPL-SCNC: 136 MMOL/L (ref 135–145)
SP GR UR STRIP.AUTO: 1.01
UROBILINOGEN UR STRIP.AUTO-MCNC: 0.2 EU/DL
WBC # BLD AUTO: 7.4 K/UL (ref 4.8–10.8)
WBC #/AREA URNS HPF: ABNORMAL /HPF

## 2025-06-10 PROCEDURE — 83605 ASSAY OF LACTIC ACID: CPT

## 2025-06-10 PROCEDURE — 81001 URINALYSIS AUTO W/SCOPE: CPT

## 2025-06-10 PROCEDURE — 70450 CT HEAD/BRAIN W/O DYE: CPT

## 2025-06-10 PROCEDURE — 84703 CHORIONIC GONADOTROPIN ASSAY: CPT

## 2025-06-10 PROCEDURE — 74022 RADEX COMPL AQT ABD SERIES: CPT

## 2025-06-10 PROCEDURE — 80053 COMPREHEN METABOLIC PANEL: CPT

## 2025-06-10 PROCEDURE — 96374 THER/PROPH/DIAG INJ IV PUSH: CPT

## 2025-06-10 PROCEDURE — 99284 EMERGENCY DEPT VISIT MOD MDM: CPT

## 2025-06-10 PROCEDURE — 700105 HCHG RX REV CODE 258: Performed by: EMERGENCY MEDICINE

## 2025-06-10 PROCEDURE — 700102 HCHG RX REV CODE 250 W/ 637 OVERRIDE(OP): Performed by: EMERGENCY MEDICINE

## 2025-06-10 PROCEDURE — 83690 ASSAY OF LIPASE: CPT

## 2025-06-10 PROCEDURE — 96361 HYDRATE IV INFUSION ADD-ON: CPT

## 2025-06-10 PROCEDURE — A9270 NON-COVERED ITEM OR SERVICE: HCPCS | Performed by: EMERGENCY MEDICINE

## 2025-06-10 PROCEDURE — 36415 COLL VENOUS BLD VENIPUNCTURE: CPT

## 2025-06-10 PROCEDURE — 96375 TX/PRO/DX INJ NEW DRUG ADDON: CPT

## 2025-06-10 PROCEDURE — 700111 HCHG RX REV CODE 636 W/ 250 OVERRIDE (IP): Mod: JZ | Performed by: EMERGENCY MEDICINE

## 2025-06-10 PROCEDURE — 85025 COMPLETE CBC W/AUTO DIFF WBC: CPT

## 2025-06-10 RX ORDER — ONDANSETRON 2 MG/ML
4 INJECTION INTRAMUSCULAR; INTRAVENOUS ONCE
Status: COMPLETED | OUTPATIENT
Start: 2025-06-10 | End: 2025-06-10

## 2025-06-10 RX ORDER — KETOROLAC TROMETHAMINE 10 MG/1
10 TABLET, FILM COATED ORAL EVERY 4 HOURS PRN
Qty: 20 TABLET | Refills: 0 | Status: SHIPPED | OUTPATIENT
Start: 2025-06-10

## 2025-06-10 RX ORDER — ACETAMINOPHEN 500 MG
500 TABLET ORAL EVERY 4 HOURS PRN
Qty: 20 TABLET | Refills: 0 | Status: SHIPPED | OUTPATIENT
Start: 2025-06-10

## 2025-06-10 RX ORDER — KETOROLAC TROMETHAMINE 15 MG/ML
15 INJECTION, SOLUTION INTRAMUSCULAR; INTRAVENOUS ONCE
Status: COMPLETED | OUTPATIENT
Start: 2025-06-10 | End: 2025-06-10

## 2025-06-10 RX ORDER — ACETAMINOPHEN 500 MG
1000 TABLET ORAL ONCE
Status: COMPLETED | OUTPATIENT
Start: 2025-06-10 | End: 2025-06-10

## 2025-06-10 RX ORDER — SODIUM CHLORIDE 9 MG/ML
1000 INJECTION, SOLUTION INTRAVENOUS ONCE
Status: COMPLETED | OUTPATIENT
Start: 2025-06-10 | End: 2025-06-10

## 2025-06-10 RX ADMIN — ONDANSETRON 4 MG: 2 INJECTION INTRAMUSCULAR; INTRAVENOUS at 02:27

## 2025-06-10 RX ADMIN — SODIUM CHLORIDE 1000 ML: 9 INJECTION, SOLUTION INTRAVENOUS at 02:27

## 2025-06-10 RX ADMIN — KETOROLAC TROMETHAMINE 15 MG: 15 INJECTION, SOLUTION INTRAMUSCULAR; INTRAVENOUS at 03:08

## 2025-06-10 RX ADMIN — ACETAMINOPHEN 1000 MG: 500 TABLET, FILM COATED ORAL at 04:04

## 2025-06-10 ASSESSMENT — PAIN DESCRIPTION - PAIN TYPE: TYPE: ACUTE PAIN

## 2025-06-10 ASSESSMENT — FIBROSIS 4 INDEX: FIB4 SCORE: 0.86

## 2025-06-10 NOTE — ED NOTES
Discharge instructions given and discussed. RX for toradol given and patient educated to come back to ER for new or worsening symptoms. Instructed to follow up with PCP. Patient verbalized understanding. All IV's removed. GCS of 15. Pt walked out with steady gait.

## 2025-06-10 NOTE — ED TRIAGE NOTES
"Chief Complaint   Patient presents with    Headache     PT walks in by herself for c/o HA x 5 days . Pt also express abd pain on LLQ     BP (!) 158/107   Pulse 86   Temp 36.4 °C (97.6 °F) (Temporal)   Resp 18   Ht 1.6 m (5' 3\")   Wt 98 kg (216 lb 0.8 oz)   SpO2 95%   BMI 38.27 kg/m²     "

## 2025-06-10 NOTE — ED PROVIDER NOTES
ED Provider Note    CHIEF COMPLAINT  Chief Complaint   Patient presents with    Headache     PT walks in by herself for c/o HA x 5 days . Pt also express abd pain on LLQ       EXTERNAL RECORDS REVIEWED  Inpatient Notes reviewed discharge summary by Dr. Cardoso dated May 13, 2022.  Admitted May 10 with UTI and concern for septicemia.  and Outpatient Notes reviewed progress note dated April 16, 2025 by Dr. Plummer of [facility]. Electromyography performed, history of carpal tunnel syndrome    HPI/ROS  LIMITATION TO HISTORY   Select: : None  OUTSIDE HISTORIAN(S):  None available    Lakesha Fagan is a 49 y.o. female who presents for evaluation of multiple concerns.  Patient relates pain to head for last 5 to 7 days.  She relates it is pressure-like, localized to bilateral frontal temporal regions with radiation across the top of the head to the occiput.  No head injury.  No nausea, no vomiting.  Relates chills and sweats but no fever.  Patient notes slightly worse with light but not noise.  She took ibuprofen at home without improvement.  She also endorses sensation of dyspnea for the last month, she saw primary care was written for an inhaler.  Notes only transient improvement for about 1 week.  No coughing or wheezing or sputum production.  No significant chest pain.  She notes as well on review of systems sensation of swelling to the left upper quadrant of the abdomen.  She notes it is not tender nor painful.  She has also had this for approximately 1 week.  No dysuria nor hematuria, does relate constipation which is fairly chronic, did not have a bowel movement today.  Given the constellation of symptoms and given headache worsening she came to be assessed.    PAST MEDICAL HISTORY   has a past medical history of Hypertension and Other specified disorder of intestines.    SURGICAL HISTORY   has a past surgical history that includes cholecystectomy; fusion, spine, lumbar, plif (3/13/2013); lumbar laminectomy  "diskectomy (3/13/2013); and lumbar decompression (3/13/2013).    FAMILY HISTORY  Family History   Problem Relation Age of Onset    Diabetes Mother     Heart Attack Mother         stent in place    Hyperlipidemia Mother     Hypertension Mother     Diabetes Father     Hypertension Father     Hyperlipidemia Father     Alcohol abuse Father     Other Father         Liver Cirrhosis    Hypertension Sister     Hyperlipidemia Sister     Anxiety disorder Sister     Diabetes Brother        SOCIAL HISTORY  Social History     Tobacco Use    Smoking status: Never    Smokeless tobacco: Never   Vaping Use    Vaping status: Never Used   Substance and Sexual Activity    Alcohol use: No    Drug use: No    Sexual activity: Not on file       CURRENT MEDICATIONS  Home Medications       Reviewed by Elena Modi R.N. (Registered Nurse) on 06/10/25 at 0141  Med List Status: <None>     Medication Last Dose Status   albuterol 108 (90 Base) MCG/ACT Aero Soln inhalation aerosol  Active   baclofen (LIORESAL) 10 MG Tab  Active   fluoxetine (PROZAC) 40 MG capsule  Active   GAVILYTE-G 236 g Recon Soln  Active   levothyroxine (SYNTHROID) 50 MCG Tab  Active   LINZESS 290 MCG Cap  Active   methadone (DOLOPHINE) 10 MG/ML Conc  Active                    ALLERGIES  Allergies[1]    PHYSICAL EXAM  VITAL SIGNS: /60   Pulse (!) 16   Temp 37.1 °C (98.7 °F) (Temporal)   Resp 16   Ht 1.6 m (5' 3\")   Wt 98 kg (216 lb 0.8 oz)   SpO2 96%   BMI 38.27 kg/m²    General: Alert, no acute distress  Skin: Warm, dry, normal for ethnicity  Head: Normocephalic, atraumatic  Neck: Trachea midline, no tenderness  Eye: PERRL, normal conjunctiva, extraocular movements intact without nystagmus  ENMT: Oral mucosa dry, no pharyngeal erythema or exudate  Cardiovascular: Regular rate and rhythm, No murmur, Normal peripheral perfusion  Respiratory: Lungs CTA, respirations are non-labored, breath sounds are equal  Gastrointestinal: Soft, nontender, non distended.  No " palpable mass, no guarding, no rebound, no rigidity  Musculoskeletal: No swelling, no deformity  Neurological: Alert and oriented to person, place, time, and situation.  Cranial nerves II through XII are grossly intact, no upper nor lower extremity pronator drift.  Upper and lower extremity strength and sensation proximal and distal 5 x 5 and symmetrical bilaterally.  Lymphatics: No cervical lymphadenopathy  Psychiatric: Cooperative, mildly anxious, otherwise appropriate mood & affect     EKG/LABS  Results for orders placed or performed during the hospital encounter of 06/10/25   HCG Qual Serum    Collection Time: 06/10/25  2:12 AM   Result Value Ref Range    Beta-Hcg Qualitative Serum Negative Negative   CBC WITH DIFFERENTIAL    Collection Time: 06/10/25  2:12 AM   Result Value Ref Range    WBC 7.4 4.8 - 10.8 K/uL    RBC 4.61 4.20 - 5.40 M/uL    Hemoglobin 14.1 12.0 - 16.0 g/dL    Hematocrit 41.6 37.0 - 47.0 %    MCV 90.2 81.4 - 97.8 fL    MCH 30.6 27.0 - 33.0 pg    MCHC 33.9 32.2 - 35.5 g/dL    RDW 42.2 35.9 - 50.0 fL    Platelet Count 298 164 - 446 K/uL    MPV 8.7 (L) 9.0 - 12.9 fL    Neutrophils-Polys 75.70 (H) 44.00 - 72.00 %    Lymphocytes 21.20 (L) 22.00 - 41.00 %    Monocytes 2.00 0.00 - 13.40 %    Eosinophils 0.00 0.00 - 6.90 %    Basophils 0.10 0.00 - 1.80 %    Immature Granulocytes 1.00 (H) 0.00 - 0.90 %    Nucleated RBC 0.00 0.00 - 0.20 /100 WBC    Neutrophils (Absolute) 5.57 1.82 - 7.42 K/uL    Lymphs (Absolute) 1.56 1.00 - 4.80 K/uL    Monos (Absolute) 0.15 0.00 - 0.85 K/uL    Eos (Absolute) 0.00 0.00 - 0.51 K/uL    Baso (Absolute) 0.01 0.00 - 0.12 K/uL    Immature Granulocytes (abs) 0.07 0.00 - 0.11 K/uL    NRBC (Absolute) 0.00 K/uL   COMP METABOLIC PANEL    Collection Time: 06/10/25  2:12 AM   Result Value Ref Range    Sodium 136 135 - 145 mmol/L    Potassium 4.4 3.6 - 5.5 mmol/L    Chloride 100 96 - 112 mmol/L    Co2 23 20 - 33 mmol/L    Anion Gap 13.0 7.0 - 16.0    Glucose 139 (H) 65 - 99 mg/dL     Bun 17 8 - 22 mg/dL    Creatinine 0.77 0.50 - 1.40 mg/dL    Calcium 9.7 8.5 - 10.5 mg/dL    Correct Calcium 9.1 8.5 - 10.5 mg/dL    AST(SGOT) 24 12 - 45 U/L    ALT(SGPT) 24 2 - 50 U/L    Alkaline Phosphatase 113 (H) 30 - 99 U/L    Total Bilirubin 0.3 0.1 - 1.5 mg/dL    Albumin 4.7 3.2 - 4.9 g/dL    Total Protein 8.1 6.0 - 8.2 g/dL    Globulin 3.4 1.9 - 3.5 g/dL    A-G Ratio 1.4 g/dL   LACTIC ACID    Collection Time: 06/10/25  2:12 AM   Result Value Ref Range    Lactic Acid 2.6 (H) 0.5 - 2.0 mmol/L   LIPASE    Collection Time: 06/10/25  2:12 AM   Result Value Ref Range    Lipase 15 11 - 82 U/L   ESTIMATED GFR    Collection Time: 06/10/25  2:12 AM   Result Value Ref Range    GFR (CKD-EPI) 95 >60 mL/min/1.73 m 2   URINALYSIS    Collection Time: 06/10/25  2:15 AM    Specimen: Urine   Result Value Ref Range    Color Yellow     Character Clear     Specific Gravity 1.008 <1.035    Ph 7.0 5.0 - 8.0    Glucose Negative Negative mg/dL    Ketones Negative Negative mg/dL    Protein Negative Negative mg/dL    Bilirubin Negative Negative    Urobilinogen, Urine 0.2 <=1.0 EU/dL    Nitrite Negative Negative    Leukocyte Esterase Small (A) Negative    Occult Blood Trace (A) Negative    Micro Urine Req Microscopic    URINE MICROSCOPIC (W/UA)    Collection Time: 06/10/25  2:15 AM   Result Value Ref Range    WBC 3-5 (A) /hpf    RBC 0-2 /hpf    Bacteria Rare (A) None /hpf    Epithelial Cells 3-5 0 - 5 /hpf    Urine Casts 0-2 0 - 2 /lpf      I have independently interpreted this EKG    RADIOLOGY/PROCEDURES   I have independently interpreted the diagnostic imaging associated with this visit and am waiting the final reading from the radiologist.   My preliminary interpretation is as follows: CT brain demonstrating no midline shift, no intracranial hemorrhage.  X-ray imaging demonstrates clear chest x-ray with no pneumonia, no evidence of obstruction on abdominal imaging but increased stool consistent with constipation    Radiologist  interpretation:  DX-ABDOMEN COMPLETE WITH AP OR PA CXR   Final Result         1.  No acute cardiopulmonary disease is evident.   2.  Moderate quantity of stool in colon favors changes of constipation, otherwise nonspecific bowel gas pattern.      CT-HEAD W/O   Final Result         1.  No acute intracranial abnormality.                   COURSE & MEDICAL DECISION MAKING    ASSESSMENT, COURSE AND PLAN  Care Narrative: 49-year-old presents for evaluation of headache for the last several days without trauma.  Not maximal in onset, getting gradually worse.  Reassuringly has nonfocal neurologic lamination with no neurologic deficits, NIH stroke scale is 0.  She notes other concerns and including constipation and sensation discomfort to the left upper abdomen.  Reassuringly no peritoneal signs on the exam, given no fever, no leukocytosis, and no evidence of surgical abdomen no indication for advanced abdominal imaging.  I suspect likely constipation confirmed by x-ray.  She notes a sensation of dyspnea but has not been wheezing nor having a cough.  Lungs are clear and she has no hypoxia and has unremarkable chest x-ray.  Regard to headache I suspect likely viral syndrome given the differential her CBC, dehydration likely exacerbating this as well.  She is feeling better with fluids and ketorolac.  Will write for bowel regimen including Metamucil, have written for appropriate analgesia for headache and recommend adequate hydration for home.  Given not vomiting otherwise reassuring presentation no indication for inpatient management as she can tolerate p.o.  Patient amenable to follow-up with established primary care.    ED OBS: Yes; I am placing the patient in to an observation status due to a diagnostic uncertainty as well as therapeutic intensity. Patient placed in observation status at 0151 AM, 6/10/2025.     Observation plan is as follows: Patient medicated with 1 L normal saline, ketorolac 15 mg IV, Zofran 4 mg IV.   "Metabolic workup with abdominal x-ray series and CT brain will be obtained.  Differential diagnosis includes but is not restricted to tension headache, dehydration, viral syndrome, constipation, pneumonia, sepsis, UTI, electrolyte derangement, intracranial hemorrhage    0407: Reassessed, feeling better, updated with lab results.    Upon Reevaluation, the patient's condition has: Improved; and will be discharged.    Patient discharged from ED Observation status at  0433(Time) 6/10/25 (Date).   Hydration: Based on the patient's presentation of Dehydration the patient was given IV fluids. IV Hydration was used because oral hydration was not as rapid as required. Upon recheck following hydration, the patient was doing somewhat better, skin tone is improving with IV fluid.    Patient Vitals for the past 24 hrs:   BP Temp Temp src Pulse Resp SpO2 Height Weight   06/10/25 0404 131/60 -- -- (!) 16 -- 96 % -- --   06/10/25 0304 132/70 37.1 °C (98.7 °F) Temporal 98 16 92 % -- --   06/10/25 0259 114/74 -- -- -- -- -- -- --   06/10/25 0136 (!) 158/107 36.4 °C (97.6 °F) Temporal 86 18 95 % 1.6 m (5' 3\") 98 kg (216 lb 0.8 oz)        ADDITIONAL PROBLEMS MANAGED  Tension type headache, dehydration, viral syndrome, constipation    DISPOSITION AND DISCUSSIONS  I have discussed management of the patient with the following physicians and MALU's:  NA    Discussion of management with other QHP or appropriate source(s): None     Escalation of care considered, and ultimately not performed:NA    Barriers to care at this time, including but not limited to: NA.     Decision tools and prescription drugs considered including, but not limited to: NIH stroke scale 0.    The patient will return for new or worsening symptoms and is stable at the time of discharge.    Patient has had high blood pressure while in the emergency department, felt likely secondary to medical condition. Counseled patient to monitor blood pressure at home and follow up with " primary care physician.      DISPOSITION:  Patient will be discharged home in stable condition.    FOLLOW UP:  CUATE Hinson  [address] [address]  [place] [id number] [phone]    Schedule an appointment as soon as possible for a visit         OUTPATIENT MEDICATIONS:  Discharge Medication List as of 6/10/2025  4:19 AM        START taking these medications    Details   psyllium 25 % packet Take 1 Packet by mouth 2 times a day for 14 days., Disp-28 Packet, R-0, Normal      ketorolac (TORADOL) 10 MG Tab Take 1 Tablet by mouth every four hours as needed for Moderate Pain or Severe Pain., Disp-20 Tablet, R-0, Normal      acetaminophen (TYLENOL) 500 MG Tab Take 1 Tablet by mouth every four hours as needed for Mild Pain or Fever., Disp-20 Tablet, R-0, Normal                FINAL DIAGNOSIS  1. Acute non intractable tension-type headache    2. Acute viral syndrome    3. Dehydration    4. Constipation, unspecified constipation type         Electronically signed by: Raul Gee M.D., 6/10/2025 1:34 AM           [1] No Known Allergies

## 2025-06-23 ENCOUNTER — OFFICE VISIT (OUTPATIENT)
Dept: MEDICAL GROUP | Facility: MEDICAL CENTER | Age: 50
End: 2025-06-23
Payer: COMMERCIAL

## 2025-06-23 VITALS
SYSTOLIC BLOOD PRESSURE: 128 MMHG | WEIGHT: 215 LBS | OXYGEN SATURATION: 96 % | DIASTOLIC BLOOD PRESSURE: 70 MMHG | TEMPERATURE: 97.6 F | HEART RATE: 68 BPM | BODY MASS INDEX: 38.09 KG/M2 | RESPIRATION RATE: 16 BRPM | HEIGHT: 63 IN

## 2025-06-23 DIAGNOSIS — R73.09 ELEVATED HEMOGLOBIN A1C: ICD-10-CM

## 2025-06-23 DIAGNOSIS — K76.0 HEPATIC STEATOSIS: ICD-10-CM

## 2025-06-23 DIAGNOSIS — E88.810 DYSMETABOLIC SYNDROME: ICD-10-CM

## 2025-06-23 DIAGNOSIS — M54.2 NECK AND SHOULDER PAIN: ICD-10-CM

## 2025-06-23 DIAGNOSIS — E03.8 SUBCLINICAL HYPOTHYROIDISM: ICD-10-CM

## 2025-06-23 DIAGNOSIS — G47.33 OSA (OBSTRUCTIVE SLEEP APNEA): ICD-10-CM

## 2025-06-23 DIAGNOSIS — E78.5 DYSLIPIDEMIA: ICD-10-CM

## 2025-06-23 DIAGNOSIS — E66.01 SEVERE OBESITY (BMI 35.0-39.9) WITH COMORBIDITY (HCC): ICD-10-CM

## 2025-06-23 DIAGNOSIS — M25.519 NECK AND SHOULDER PAIN: ICD-10-CM

## 2025-06-23 DIAGNOSIS — R12 HEARTBURN: Primary | ICD-10-CM

## 2025-06-23 PROCEDURE — 3074F SYST BP LT 130 MM HG: CPT | Performed by: NURSE PRACTITIONER

## 2025-06-23 PROCEDURE — 3078F DIAST BP <80 MM HG: CPT | Performed by: NURSE PRACTITIONER

## 2025-06-23 PROCEDURE — 99214 OFFICE O/P EST MOD 30 MIN: CPT | Performed by: NURSE PRACTITIONER

## 2025-06-23 RX ORDER — LEVOTHYROXINE SODIUM 100 UG/1
100 TABLET ORAL
Qty: 90 TABLET | Refills: 3 | Status: SHIPPED | OUTPATIENT
Start: 2025-06-23

## 2025-06-23 RX ORDER — BACLOFEN 10 MG/1
5-10 TABLET ORAL NIGHTLY PRN
Qty: 90 TABLET | Refills: 3 | Status: SHIPPED | OUTPATIENT
Start: 2025-06-23 | End: 2025-06-24 | Stop reason: SDUPTHER

## 2025-06-23 ASSESSMENT — FIBROSIS 4 INDEX: FIB4 SCORE: 0.79

## 2025-06-23 NOTE — PROGRESS NOTES
Subjective:     HPI:     Lakesha Fagan is a 49 y.o. female presents to discuss:   Chief Complaint   Patient presents with    Follow-Up       Verbal consent was acquired by the patient to use ISC8 ambient listening note generation during this visit Yes     Recent ER visit 6/10/2025    Back pain  The etiology of the pain remains uncertain, with potential causes including musculoskeletal issues or nerve impingement. The absence of leg swelling or fluid overload symptoms suggests that cardiac involvement is unlikely. The pain could also be related to gastroesophageal reflux disease (GERD).  Diagnostic plan: A request for her medical records from [facility] will be submitted for further evaluation. Pt reported ACS ruled out with EKG, and imaging-although does not recall.   Treatment plan: She will commence a regimen of omeprazole 20 mg daily for a duration of 6 to 8 weeks. If the omeprazole does not alleviate her symptoms, further diagnostic tests may be considered.    Shortness of breath  The shortness of breath could be due to various factors such as anemia, heart issues, smoking, or allergies. Vitals are normal, and there is no history of COPD or asthma.  No acute distress  Treatment plan: She is advised to use an albuterol inhaler as needed to manage her shortness of breath. If the inhaler proves effective, it may indicate a respiratory issue. A prescription for an albuterol inhaler has been provided.  ?consider PFT's    Heartburn  She reports experiencing heartburn and has been using omeprazole as needed.  Treatment plan: She will start taking omeprazole 20 mg daily for 6 to 8 weeks to manage her heartburn. This may also help alleviate her shortness of breath if it is related to GERD.  History of Present Illness      ROS: : see above    Current Medications[1]    Allergies[2]    Objective:     Vitals: /70   Pulse 68   Temp 36.4 °C (97.6 °F) (Temporal)   Resp 16   Ht 1.6 m (5'  "3\")   Wt 97.5 kg (215 lb)   SpO2 96%   BMI 38.09 kg/m²      General: Alert, pleasant, NAD  HEENT: Normocephalic.  Neck supple.   Respiratory: no distress, no audible wheezing, RR -WNL  Skin: Warm, dry, no rashes.  Extremities: No leg edema. No discoloration  Neurological: No tremors  Psych:  Affect/mood is normal, judgement is good, memory is intact, grooming is appropriate.    Results       Assessment/Plan:      1. Neck and shoulder pain  - baclofen (LIORESAL) 10 MG Tab; Take 0.5-1 Tablets by mouth at bedtime as needed (muscle tension).  Dispense: 90 Tablet; Refill: 3    2. Subclinical hypothyroidism  - levothyroxine (SYNTHROID) 100 MCG Tab; Take 1 Tablet by mouth every morning on an empty stomach.  Dispense: 90 Tablet; Refill: 3  - TSH; Future  - FREE THYROXINE; Future    3. Dysmetabolic syndrome  - levothyroxine (SYNTHROID) 100 MCG Tab; Take 1 Tablet by mouth every morning on an empty stomach.  Dispense: 90 Tablet; Refill: 3    4. Elevated hemoglobin A1c  - levothyroxine (SYNTHROID) 100 MCG Tab; Take 1 Tablet by mouth every morning on an empty stomach.  Dispense: 90 Tablet; Refill: 3    5. Dyslipidemia  - levothyroxine (SYNTHROID) 100 MCG Tab; Take 1 Tablet by mouth every morning on an empty stomach.  Dispense: 90 Tablet; Refill: 3    6. ALEXANDRA (obstructive sleep apnea)  - levothyroxine (SYNTHROID) 100 MCG Tab; Take 1 Tablet by mouth every morning on an empty stomach.  Dispense: 90 Tablet; Refill: 3    7. Hepatic steatosis  - levothyroxine (SYNTHROID) 100 MCG Tab; Take 1 Tablet by mouth every morning on an empty stomach.  Dispense: 90 Tablet; Refill: 3    8. Severe obesity (BMI 35.0-39.9) with comorbidity (HCC)  - levothyroxine (SYNTHROID) 100 MCG Tab; Take 1 Tablet by mouth every morning on an empty stomach.  Dispense: 90 Tablet; Refill: 3       Assessment & Plan      No follow-ups on file.    General Recommendations:   Smoking: recommend complete avoidance of all tobacco products  Alcohol: recommend limiting " consumption  Physical Activity: goal is 30 min of moderate activity 5 times a week  Weight Management and Nutrition: high vegetable, high protein diet like the Mediterranean diet, portion control, avoid excessive sugars, Low Glycemic Index foods, adequate hydration, sleep.     Please note that this dictation was created using voice recognition software. I have made every reasonable attempt to correct obvious errors, but I expect that there are errors of grammar and possibly content that I did not discover before finalizing the note.     Monae CUELLO          [1]   Current Outpatient Medications:     baclofen (LIORESAL) 10 MG Tab, Take 0.5-1 Tablets by mouth at bedtime as needed (muscle tension)., Disp: 90 Tablet, Rfl: 3    levothyroxine (SYNTHROID) 100 MCG Tab, Take 1 Tablet by mouth every morning on an empty stomach., Disp: 90 Tablet, Rfl: 3    psyllium 25 % packet, Take 1 Packet by mouth 2 times a day for 14 days., Disp: 28 Packet, Rfl: 0    ketorolac (TORADOL) 10 MG Tab, Take 1 Tablet by mouth every four hours as needed for Moderate Pain or Severe Pain., Disp: 20 Tablet, Rfl: 0    acetaminophen (TYLENOL) 500 MG Tab, Take 1 Tablet by mouth every four hours as needed for Mild Pain or Fever., Disp: 20 Tablet, Rfl: 0    albuterol 108 (90 Base) MCG/ACT Aero Soln inhalation aerosol, Inhale 2 Puffs every 6 hours as needed for Shortness of Breath., Disp: 8.5 g, Rfl: 2    fluoxetine (PROZAC) 40 MG capsule, Take 1 Capsule by mouth every day., Disp: 90 Capsule, Rfl: 3    GAVILYTE-G 236 g Recon Soln, USE AS INSTRUCTED BY OFFICE, Disp: , Rfl:     LINZESS 290 MCG Cap, TAKE 290 MCG CAPSULE BY MOUTH ONCE A DAY FOR IRRITABLE BOWEL SYNDROME WITH CONSTIPATION, Disp: , Rfl:     methadone (DOLOPHINE) 10 MG/ML Conc, Take 30 mg by mouth every day. [facility] ([phone]) unable to verify dose, they are closed today (5/7/2023), Disp: , Rfl:   [2] No Known Allergies

## 2025-06-24 DIAGNOSIS — M54.2 NECK AND SHOULDER PAIN: ICD-10-CM

## 2025-06-24 DIAGNOSIS — M25.519 NECK AND SHOULDER PAIN: ICD-10-CM

## 2025-06-24 NOTE — TELEPHONE ENCOUNTER
Pt called stating the baclofen was suppose to be taking 2 not 1 and the pharmacy needs a new prescription reflecting that

## 2025-06-25 RX ORDER — BACLOFEN 10 MG/1
10 TABLET ORAL 2 TIMES DAILY
Qty: 180 TABLET | Refills: 2 | Status: SHIPPED | OUTPATIENT
Start: 2025-06-25

## 2025-08-18 DIAGNOSIS — R06.09 DOE (DYSPNEA ON EXERTION): ICD-10-CM

## 2025-08-19 ENCOUNTER — OFFICE VISIT (OUTPATIENT)
Dept: MEDICAL GROUP | Facility: MEDICAL CENTER | Age: 50
End: 2025-08-19
Payer: COMMERCIAL

## 2025-08-19 VITALS
HEART RATE: 66 BPM | OXYGEN SATURATION: 95 % | SYSTOLIC BLOOD PRESSURE: 118 MMHG | WEIGHT: 215 LBS | HEIGHT: 63 IN | DIASTOLIC BLOOD PRESSURE: 72 MMHG | BODY MASS INDEX: 38.09 KG/M2 | RESPIRATION RATE: 12 BRPM | TEMPERATURE: 97.8 F

## 2025-08-19 DIAGNOSIS — M54.50 ACUTE MIDLINE LOW BACK PAIN, UNSPECIFIED WHETHER SCIATICA PRESENT: Primary | ICD-10-CM

## 2025-08-19 DIAGNOSIS — M25.561 ACUTE PAIN OF BOTH KNEES: ICD-10-CM

## 2025-08-19 DIAGNOSIS — M25.562 ACUTE PAIN OF BOTH KNEES: ICD-10-CM

## 2025-08-19 DIAGNOSIS — R73.03 PREDIABETES: ICD-10-CM

## 2025-08-19 DIAGNOSIS — Z12.31 ENCOUNTER FOR SCREENING MAMMOGRAM FOR BREAST CANCER: ICD-10-CM

## 2025-08-19 DIAGNOSIS — E66.01 SEVERE OBESITY (BMI 35.0-39.9) WITH COMORBIDITY (HCC): ICD-10-CM

## 2025-08-19 DIAGNOSIS — N95.1 MENOPAUSAL SYMPTOMS: ICD-10-CM

## 2025-08-19 PROCEDURE — 3074F SYST BP LT 130 MM HG: CPT | Performed by: NURSE PRACTITIONER

## 2025-08-19 PROCEDURE — 99214 OFFICE O/P EST MOD 30 MIN: CPT | Performed by: NURSE PRACTITIONER

## 2025-08-19 PROCEDURE — 3078F DIAST BP <80 MM HG: CPT | Performed by: NURSE PRACTITIONER

## 2025-08-19 RX ORDER — METFORMIN HYDROCHLORIDE 500 MG/1
500 TABLET, EXTENDED RELEASE ORAL DAILY
Qty: 100 TABLET | Refills: 3 | Status: SHIPPED | OUTPATIENT
Start: 2025-08-19 | End: 2026-09-23

## 2025-08-19 RX ORDER — BUPROPION HYDROCHLORIDE 150 MG/1
150 TABLET ORAL EVERY MORNING
Qty: 90 TABLET | Refills: 3 | Status: SHIPPED | OUTPATIENT
Start: 2025-08-19

## 2025-08-19 ASSESSMENT — FIBROSIS 4 INDEX: FIB4 SCORE: 0.79

## 2025-08-21 RX ORDER — ALBUTEROL SULFATE 90 UG/1
2 INHALANT RESPIRATORY (INHALATION) EVERY 6 HOURS PRN
Qty: 8.5 EACH | Refills: 2 | Status: SHIPPED | OUTPATIENT
Start: 2025-08-21